# Patient Record
Sex: MALE | Race: WHITE | NOT HISPANIC OR LATINO | Employment: OTHER | URBAN - METROPOLITAN AREA
[De-identification: names, ages, dates, MRNs, and addresses within clinical notes are randomized per-mention and may not be internally consistent; named-entity substitution may affect disease eponyms.]

---

## 2018-03-15 ENCOUNTER — TRANSCRIBE ORDERS (OUTPATIENT)
Dept: ADMINISTRATIVE | Age: 68
End: 2018-03-15

## 2018-03-15 ENCOUNTER — APPOINTMENT (OUTPATIENT)
Dept: LAB | Age: 68
End: 2018-03-15
Payer: COMMERCIAL

## 2018-03-15 DIAGNOSIS — F32.A FATIGUE DUE TO DEPRESSION: Primary | ICD-10-CM

## 2018-03-15 DIAGNOSIS — R53.83 FATIGUE DUE TO DEPRESSION: ICD-10-CM

## 2018-03-15 DIAGNOSIS — F32.A FATIGUE DUE TO DEPRESSION: ICD-10-CM

## 2018-03-15 DIAGNOSIS — R53.83 FATIGUE DUE TO DEPRESSION: Primary | ICD-10-CM

## 2018-03-15 LAB
25(OH)D3 SERPL-MCNC: 23.6 NG/ML (ref 30–100)
ALBUMIN SERPL BCP-MCNC: 3.8 G/DL (ref 3.5–5)
ALP SERPL-CCNC: 68 U/L (ref 46–116)
ALT SERPL W P-5'-P-CCNC: 24 U/L (ref 12–78)
ANION GAP SERPL CALCULATED.3IONS-SCNC: 4 MMOL/L (ref 4–13)
AST SERPL W P-5'-P-CCNC: 22 U/L (ref 5–45)
BILIRUB SERPL-MCNC: 0.62 MG/DL (ref 0.2–1)
BUN SERPL-MCNC: 22 MG/DL (ref 5–25)
CALCIUM SERPL-MCNC: 8.8 MG/DL (ref 8.3–10.1)
CHLORIDE SERPL-SCNC: 107 MMOL/L (ref 100–108)
CO2 SERPL-SCNC: 28 MMOL/L (ref 21–32)
CREAT SERPL-MCNC: 1.14 MG/DL (ref 0.6–1.3)
ERYTHROCYTE [DISTWIDTH] IN BLOOD BY AUTOMATED COUNT: 13.2 % (ref 11.6–15.1)
GFR SERPL CREATININE-BSD FRML MDRD: 67 ML/MIN/1.73SQ M
GLUCOSE SERPL-MCNC: 85 MG/DL (ref 65–140)
HCT VFR BLD AUTO: 44.9 % (ref 36.5–49.3)
HGB BLD-MCNC: 15.7 G/DL (ref 12–17)
MCH RBC QN AUTO: 29.7 PG (ref 26.8–34.3)
MCHC RBC AUTO-ENTMCNC: 35 G/DL (ref 31.4–37.4)
MCV RBC AUTO: 85 FL (ref 82–98)
PLATELET # BLD AUTO: 184 THOUSANDS/UL (ref 149–390)
PMV BLD AUTO: 10.1 FL (ref 8.9–12.7)
POTASSIUM SERPL-SCNC: 4.4 MMOL/L (ref 3.5–5.3)
PROT SERPL-MCNC: 7.5 G/DL (ref 6.4–8.2)
RBC # BLD AUTO: 5.29 MILLION/UL (ref 3.88–5.62)
SODIUM SERPL-SCNC: 139 MMOL/L (ref 136–145)
TSH SERPL DL<=0.05 MIU/L-ACNC: 1.54 UIU/ML (ref 0.36–3.74)
VIT B12 SERPL-MCNC: 719 PG/ML (ref 100–900)
WBC # BLD AUTO: 4.21 THOUSAND/UL (ref 4.31–10.16)

## 2018-03-15 PROCEDURE — 85027 COMPLETE CBC AUTOMATED: CPT

## 2018-03-15 PROCEDURE — 80053 COMPREHEN METABOLIC PANEL: CPT

## 2018-03-15 PROCEDURE — 84443 ASSAY THYROID STIM HORMONE: CPT

## 2018-03-15 PROCEDURE — 82607 VITAMIN B-12: CPT

## 2018-03-15 PROCEDURE — 82306 VITAMIN D 25 HYDROXY: CPT

## 2018-03-15 PROCEDURE — 36415 COLL VENOUS BLD VENIPUNCTURE: CPT

## 2018-06-01 ENCOUNTER — TRANSCRIBE ORDERS (OUTPATIENT)
Dept: ADMINISTRATIVE | Age: 68
End: 2018-06-01

## 2018-06-01 ENCOUNTER — APPOINTMENT (OUTPATIENT)
Dept: LAB | Age: 68
End: 2018-06-01
Payer: COMMERCIAL

## 2018-06-01 DIAGNOSIS — R53.83 OTHER FATIGUE: Primary | ICD-10-CM

## 2018-06-01 DIAGNOSIS — R53.83 FATIGUE, UNSPECIFIED TYPE: ICD-10-CM

## 2018-06-01 LAB
ALBUMIN SERPL BCP-MCNC: 3.6 G/DL (ref 3.5–5)
ALP SERPL-CCNC: 77 U/L (ref 46–116)
ALT SERPL W P-5'-P-CCNC: 28 U/L (ref 12–78)
ANION GAP SERPL CALCULATED.3IONS-SCNC: 7 MMOL/L (ref 4–13)
AST SERPL W P-5'-P-CCNC: 22 U/L (ref 5–45)
BASOPHILS # BLD AUTO: 0.06 THOUSANDS/ΜL (ref 0–0.1)
BASOPHILS NFR BLD AUTO: 1 % (ref 0–1)
BILIRUB SERPL-MCNC: 0.68 MG/DL (ref 0.2–1)
BUN SERPL-MCNC: 14 MG/DL (ref 5–25)
CALCIUM SERPL-MCNC: 9 MG/DL (ref 8.3–10.1)
CHLORIDE SERPL-SCNC: 105 MMOL/L (ref 100–108)
CO2 SERPL-SCNC: 28 MMOL/L (ref 21–32)
CREAT SERPL-MCNC: 1.09 MG/DL (ref 0.6–1.3)
EOSINOPHIL # BLD AUTO: 0.26 THOUSAND/ΜL (ref 0–0.61)
EOSINOPHIL NFR BLD AUTO: 4 % (ref 0–6)
ERYTHROCYTE [DISTWIDTH] IN BLOOD BY AUTOMATED COUNT: 12.7 % (ref 11.6–15.1)
GFR SERPL CREATININE-BSD FRML MDRD: 71 ML/MIN/1.73SQ M
GLUCOSE SERPL-MCNC: 89 MG/DL (ref 65–140)
HCT VFR BLD AUTO: 44.4 % (ref 36.5–49.3)
HGB BLD-MCNC: 14.8 G/DL (ref 12–17)
IMM GRANULOCYTES # BLD AUTO: 0.01 THOUSAND/UL (ref 0–0.2)
IMM GRANULOCYTES NFR BLD AUTO: 0 % (ref 0–2)
LYMPHOCYTES # BLD AUTO: 1.35 THOUSANDS/ΜL (ref 0.6–4.47)
LYMPHOCYTES NFR BLD AUTO: 20 % (ref 14–44)
MCH RBC QN AUTO: 29.4 PG (ref 26.8–34.3)
MCHC RBC AUTO-ENTMCNC: 33.3 G/DL (ref 31.4–37.4)
MCV RBC AUTO: 88 FL (ref 82–98)
MONOCYTES # BLD AUTO: 0.54 THOUSAND/ΜL (ref 0.17–1.22)
MONOCYTES NFR BLD AUTO: 8 % (ref 4–12)
NEUTROPHILS # BLD AUTO: 4.5 THOUSANDS/ΜL (ref 1.85–7.62)
NEUTS SEG NFR BLD AUTO: 67 % (ref 43–75)
NRBC BLD AUTO-RTO: 0 /100 WBCS
PLATELET # BLD AUTO: 197 THOUSANDS/UL (ref 149–390)
PMV BLD AUTO: 10.6 FL (ref 8.9–12.7)
POTASSIUM SERPL-SCNC: 4 MMOL/L (ref 3.5–5.3)
PROT SERPL-MCNC: 7.1 G/DL (ref 6.4–8.2)
RBC # BLD AUTO: 5.04 MILLION/UL (ref 3.88–5.62)
SODIUM SERPL-SCNC: 140 MMOL/L (ref 136–145)
VIT B12 SERPL-MCNC: 696 PG/ML (ref 100–900)
WBC # BLD AUTO: 6.72 THOUSAND/UL (ref 4.31–10.16)

## 2018-06-01 PROCEDURE — 80053 COMPREHEN METABOLIC PANEL: CPT

## 2018-06-01 PROCEDURE — 86618 LYME DISEASE ANTIBODY: CPT

## 2018-06-01 PROCEDURE — 82607 VITAMIN B-12: CPT

## 2018-06-01 PROCEDURE — 36415 COLL VENOUS BLD VENIPUNCTURE: CPT

## 2018-06-01 PROCEDURE — 85025 COMPLETE CBC W/AUTO DIFF WBC: CPT

## 2018-06-04 LAB — MISCELLANEOUS LAB TEST RESULT: NORMAL

## 2018-06-06 ENCOUNTER — APPOINTMENT (OUTPATIENT)
Dept: LAB | Age: 68
End: 2018-06-06
Payer: COMMERCIAL

## 2018-06-06 DIAGNOSIS — R53.83 FATIGUE, UNSPECIFIED TYPE: ICD-10-CM

## 2018-06-06 LAB — TSH SERPL DL<=0.05 MIU/L-ACNC: 1.93 UIU/ML (ref 0.36–3.74)

## 2018-06-06 PROCEDURE — 36415 COLL VENOUS BLD VENIPUNCTURE: CPT

## 2018-06-06 PROCEDURE — 84443 ASSAY THYROID STIM HORMONE: CPT

## 2018-06-08 ENCOUNTER — APPOINTMENT (OUTPATIENT)
Dept: LAB | Age: 68
End: 2018-06-08
Payer: COMMERCIAL

## 2018-06-08 DIAGNOSIS — R53.83 OTHER FATIGUE: ICD-10-CM

## 2018-06-08 LAB
CRP SERPL QL: <3 MG/L
ERYTHROCYTE [SEDIMENTATION RATE] IN BLOOD: 13 MM/HOUR (ref 0–10)

## 2018-06-08 PROCEDURE — 82785 ASSAY OF IGE: CPT

## 2018-06-08 PROCEDURE — 85652 RBC SED RATE AUTOMATED: CPT

## 2018-06-08 PROCEDURE — 86777 TOXOPLASMA ANTIBODY: CPT

## 2018-06-08 PROCEDURE — 86778 TOXOPLASMA ANTIBODY IGM: CPT

## 2018-06-08 PROCEDURE — 36415 COLL VENOUS BLD VENIPUNCTURE: CPT

## 2018-06-08 PROCEDURE — 86140 C-REACTIVE PROTEIN: CPT

## 2018-06-09 LAB
CLINICAL COMMENT: NORMAL
T GONDII IGG SERPL IA-ACNC: <3 IU/ML (ref 0–7.1)
T GONDII IGM SER IA-ACNC: <3 AU/ML (ref 0–7.9)

## 2018-06-11 LAB — TOTAL IGE SMQN RAST: 59 KU/L (ref 0–113)

## 2018-06-29 ENCOUNTER — OFFICE VISIT (OUTPATIENT)
Dept: GASTROENTEROLOGY | Facility: MEDICAL CENTER | Age: 68
End: 2018-06-29
Payer: COMMERCIAL

## 2018-06-29 VITALS
BODY MASS INDEX: 27 KG/M2 | SYSTOLIC BLOOD PRESSURE: 120 MMHG | HEART RATE: 64 BPM | WEIGHT: 172 LBS | DIASTOLIC BLOOD PRESSURE: 70 MMHG | HEIGHT: 67 IN | TEMPERATURE: 97.9 F

## 2018-06-29 DIAGNOSIS — R11.0 NAUSEA: Primary | ICD-10-CM

## 2018-06-29 DIAGNOSIS — R10.12 LEFT UPPER QUADRANT PAIN: ICD-10-CM

## 2018-06-29 DIAGNOSIS — R63.4 WEIGHT LOSS: ICD-10-CM

## 2018-06-29 DIAGNOSIS — R53.83 FATIGUE, UNSPECIFIED TYPE: ICD-10-CM

## 2018-06-29 DIAGNOSIS — R63.0 LOSS OF APPETITE: ICD-10-CM

## 2018-06-29 PROCEDURE — 99244 OFF/OP CNSLTJ NEW/EST MOD 40: CPT | Performed by: INTERNAL MEDICINE

## 2018-06-29 RX ORDER — SODIUM, POTASSIUM,MAG SULFATES 17.5-3.13G
180 SOLUTION, RECONSTITUTED, ORAL ORAL ONCE
Qty: 180 ML | Refills: 0 | Status: SHIPPED | OUTPATIENT
Start: 2018-06-29 | End: 2018-08-21

## 2018-06-29 NOTE — LETTER
June 29, 2018     Referral [address] [place] [id number]    Patient: Margot Barrera   YOB: 1950   Date of Visit: 6/29/2018       Dear Dr Robyn Giron:    Thank you for referring Little Kimball to me for evaluation  Below are my notes for this consultation  If you have questions, please do not hesitate to call me  I look forward to following your patient along with you  Sincerely,        Harrison Neal MD        CC: Fidel Wayne MD  6/29/2018  9:09 AM  Sign at close encounter  [facility] - Outpatient Consultation  Margot Barrera 74 y o  male MRN: [id number]  Encounter: [id number]          ASSESSMENT AND PLAN:      1  Nausea  2  Fatigue, unspecified type  3  Loss of appetite  4  Left upper quadrant pain  5  Weight loss  His symptoms may be due to the Lipitor and I am hoping his symptoms continue to improve  However since they have persisted to this point I will schedule him for an upper endoscopy to rule out peptic ulcer disease, Helicobacter pylori infection, and gastritis  Because of his associated weight loss I will also schedule him for colonoscopy since his last was about 10 years ago  I encouraged him to take an increased dose of Zofran at 8 mg per dose and to take the lansoprazole on a daily basis half an hour before breakfast     ______________________________________________________________________    HPI:  He was started on Lipitor a few months ago and developed fatigue, nausea, decreased appetite, and mild left upper quadrant pain after starting the medicine  After a few weeks he stopped taking the medicine about four weeks ago, but his symptoms have persisted  He was told his symptoms were probably related to the Lipitor but he is concerned that he is not yet feeling better  He denies vomiting, change in bowel habits, or bleeding, and he reports about 10 lb weight loss over the past few months      He believes his last upper endoscopy was about eight years ago and is last colonoscopy was about 10 years ago  REVIEW OF SYSTEMS:    CONSTITUTIONAL: Denies any fever, chills, rigors, and weight loss, but complains of fatigue  HEENT: No earache or tinnitus  Denies hearing loss or visual disturbances  CARDIOVASCULAR: No chest pain or palpitations  RESPIRATORY: Denies any cough, hemoptysis, shortness of breath or dyspnea on exertion  GASTROINTESTINAL: As noted in the History of Present Illness  GENITOURINARY: No problems with urination  Denies any hematuria or dysuria  NEUROLOGIC: No dizziness or vertigo, denies headaches  MUSCULOSKELETAL: Denies any muscle or joint pain  SKIN: Denies skin rashes, but complains of itching  ENDOCRINE: Denies excessive thirst  Denies intolerance to heat or cold  PSYCHOSOCIAL: Denies depression or anxiety  Denies any recent memory loss  Historical Information   Past Medical History:   Diagnosis Date    GERD (gastroesophageal reflux disease)      Past Surgical History:   Procedure Laterality Date    BACK SURGERY      COLONOSCOPY      FOOT SURGERY       Social History   History   Alcohol Use No     History   Drug Use No     History   Smoking Status    Never Smoker   Smokeless Tobacco    Never Used     Family History   Problem Relation Age of Onset    Lung cancer Mother        Meds/Allergies     No current outpatient prescriptions on file  No Known Allergies        Objective     Blood pressure 120/70, pulse 64, temperature 97 9 °F (36 6 °C), temperature source Tympanic, height 5' 7" (1 702 m), weight 78 kg (172 lb)  Body mass index is 26 94 kg/m²          PHYSICAL EXAM:      General Appearance:   Alert, cooperative, no distress   HEENT:   Normocephalic, atraumatic, anicteric      Neck:  Supple, symmetrical, trachea midline   Lungs:   Clear to auscultation bilaterally; no rales, rhonchi or wheezing; respirations unlabored    Heart[de-identified]   Regular rate and rhythm; no murmur, rub, or gallop  Abdomen:   Soft, non-tender, non-distended; normal bowel sounds; no masses, no organomegaly    Genitalia:   Deferred    Rectal:   Deferred    Extremities:  No cyanosis, clubbing or edema    Pulses:  2+ and symmetric    Skin:  No jaundice, rashes, or lesions    Lymph nodes:  No palpable cervical lymphadenopathy        Lab Results:   No visits with results within 1 Day(s) from this visit  Latest known visit with results is:   Appointment on 06/08/2018   Component Date Value    CRP 06/08/2018 <3 0     Sed Rate 06/08/2018 13*    Toxoplasma Gondii IgG 06/08/2018 <3 0     Toxoplasma IgM 06/08/2018 <3 0     Comment 06/08/2018 Comment     IgE 06/08/2018 59 0          Radiology Results:   No results found

## 2018-06-29 NOTE — PROGRESS NOTES
[facility] - Outpatient Consultation  Brittney Young 74 y o  male MRN: [id number]  Encounter: [id number]          ASSESSMENT AND PLAN:      1  Nausea  2  Fatigue, unspecified type  3  Loss of appetite  4  Left upper quadrant pain  5  Weight loss  His symptoms may be due to the Lipitor and I am hoping his symptoms continue to improve  However since they have persisted to this point I will schedule him for an upper endoscopy to rule out peptic ulcer disease, Helicobacter pylori infection, and gastritis  Because of his associated weight loss I will also schedule him for colonoscopy since his last was about 10 years ago  I encouraged him to take an increased dose of Zofran at 8 mg per dose and to take the lansoprazole on a daily basis half an hour before breakfast     ______________________________________________________________________    HPI:  He was started on Lipitor a few months ago and developed fatigue, nausea, decreased appetite, and mild left upper quadrant pain after starting the medicine  After a few weeks he stopped taking the medicine about four weeks ago, but his symptoms have persisted  He was told his symptoms were probably related to the Lipitor but he is concerned that he is not yet feeling better  He denies vomiting, change in bowel habits, or bleeding, and he reports about 10 lb weight loss over the past few months  He believes his last upper endoscopy was about eight years ago and is last colonoscopy was about 10 years ago  REVIEW OF SYSTEMS:    CONSTITUTIONAL: Denies any fever, chills, rigors, and weight loss, but complains of fatigue  HEENT: No earache or tinnitus  Denies hearing loss or visual disturbances  CARDIOVASCULAR: No chest pain or palpitations  RESPIRATORY: Denies any cough, hemoptysis, shortness of breath or dyspnea on exertion  GASTROINTESTINAL: As noted in the History of Present Illness  GENITOURINARY: No problems with urination  Denies any hematuria or dysuria  NEUROLOGIC: No dizziness or vertigo, denies headaches  MUSCULOSKELETAL: Denies any muscle or joint pain  SKIN: Denies skin rashes, but complains of itching  ENDOCRINE: Denies excessive thirst  Denies intolerance to heat or cold  PSYCHOSOCIAL: Denies depression or anxiety  Denies any recent memory loss  Historical Information   Past Medical History:   Diagnosis Date    GERD (gastroesophageal reflux disease)      Past Surgical History:   Procedure Laterality Date    BACK SURGERY      COLONOSCOPY      FOOT SURGERY       Social History   History   Alcohol Use No     History   Drug Use No     History   Smoking Status    Never Smoker   Smokeless Tobacco    Never Used     Family History   Problem Relation Age of Onset    Lung cancer Mother        Meds/Allergies     No current outpatient prescriptions on file  No Known Allergies        Objective     Blood pressure 120/70, pulse 64, temperature 97 9 °F (36 6 °C), temperature source Tympanic, height 5' 7" (1 702 m), weight 78 kg (172 lb)  Body mass index is 26 94 kg/m²  PHYSICAL EXAM:      General Appearance:   Alert, cooperative, no distress   HEENT:   Normocephalic, atraumatic, anicteric      Neck:  Supple, symmetrical, trachea midline   Lungs:   Clear to auscultation bilaterally; no rales, rhonchi or wheezing; respirations unlabored    Heart[de-identified]   Regular rate and rhythm; no murmur, rub, or gallop  Abdomen:   Soft, non-tender, non-distended; normal bowel sounds; no masses, no organomegaly    Genitalia:   Deferred    Rectal:   Deferred    Extremities:  No cyanosis, clubbing or edema    Pulses:  2+ and symmetric    Skin:  No jaundice, rashes, or lesions    Lymph nodes:  No palpable cervical lymphadenopathy        Lab Results:   No visits with results within 1 Day(s) from this visit     Latest known visit with results is:   Appointment on 06/08/2018   Component Date Value    CRP 06/08/2018 <3 0     Sed Rate 06/08/2018 13*    Toxoplasma Gondii IgG 06/08/2018 <3 0     Toxoplasma IgM 06/08/2018 <3 0     Comment 06/08/2018 Comment     IgE 06/08/2018 59 0          Radiology Results:   No results found

## 2018-06-29 NOTE — PATIENT INSTRUCTIONS
Pt was scheduled July 25, 2018 at [place] [address] for a colon/egd with Dr Kenroy Corrigan was gone over by the MA  He is aware he will receive a call the day prior with an arrival time

## 2018-07-09 ENCOUNTER — TELEPHONE (OUTPATIENT)
Dept: GASTROENTEROLOGY | Facility: CLINIC | Age: 68
End: 2018-07-09

## 2018-07-09 DIAGNOSIS — R11.0 NAUSEA: Primary | ICD-10-CM

## 2018-07-09 RX ORDER — PROMETHAZINE HYDROCHLORIDE 12.5 MG/1
12.5 TABLET ORAL EVERY 6 HOURS PRN
Qty: 20 TABLET | Refills: 0 | Status: ON HOLD | OUTPATIENT
Start: 2018-07-09 | End: 2018-07-25

## 2018-07-09 NOTE — TELEPHONE ENCOUNTER
Dr Shady Lozano pt    Pt called in stating his pcp prescribed him Ondansetron, which he is now out of but that medication did not help much with his nausea  The pt would like to know if another medication can be prescribed for nausea  Please advise   Please send to [address] in [place] phone# [phone]

## 2018-07-24 ENCOUNTER — ANESTHESIA EVENT (OUTPATIENT)
Dept: GASTROENTEROLOGY | Facility: MEDICAL CENTER | Age: 68
End: 2018-07-24
Payer: COMMERCIAL

## 2018-07-25 ENCOUNTER — ANESTHESIA (OUTPATIENT)
Dept: GASTROENTEROLOGY | Facility: MEDICAL CENTER | Age: 68
End: 2018-07-25
Payer: COMMERCIAL

## 2018-07-25 ENCOUNTER — HOSPITAL ENCOUNTER (OUTPATIENT)
Facility: MEDICAL CENTER | Age: 68
Setting detail: OUTPATIENT SURGERY
Discharge: HOME | End: 2018-07-25
Attending: INTERNAL MEDICINE | Admitting: INTERNAL MEDICINE
Payer: COMMERCIAL

## 2018-07-25 VITALS
OXYGEN SATURATION: 97 % | WEIGHT: 174 LBS | SYSTOLIC BLOOD PRESSURE: 166 MMHG | HEART RATE: 73 BPM | HEIGHT: 67 IN | BODY MASS INDEX: 27.31 KG/M2 | DIASTOLIC BLOOD PRESSURE: 66 MMHG | TEMPERATURE: 98 F | RESPIRATION RATE: 16 BRPM

## 2018-07-25 DIAGNOSIS — R11.0 NAUSEA: ICD-10-CM

## 2018-07-25 DIAGNOSIS — R63.4 WEIGHT LOSS: ICD-10-CM

## 2018-07-25 DIAGNOSIS — R63.0 LOSS OF APPETITE: ICD-10-CM

## 2018-07-25 DIAGNOSIS — R53.83 FATIGUE, UNSPECIFIED TYPE: ICD-10-CM

## 2018-07-25 DIAGNOSIS — R10.12 LEFT UPPER QUADRANT PAIN: ICD-10-CM

## 2018-07-25 PROCEDURE — 88305 TISSUE EXAM BY PATHOLOGIST: CPT | Performed by: PATHOLOGY

## 2018-07-25 PROCEDURE — 45380 COLONOSCOPY AND BIOPSY: CPT | Performed by: INTERNAL MEDICINE

## 2018-07-25 PROCEDURE — 43239 EGD BIOPSY SINGLE/MULTIPLE: CPT | Performed by: INTERNAL MEDICINE

## 2018-07-25 PROCEDURE — 45385 COLONOSCOPY W/LESION REMOVAL: CPT | Performed by: INTERNAL MEDICINE

## 2018-07-25 PROCEDURE — 88342 IMHCHEM/IMCYTCHM 1ST ANTB: CPT | Performed by: PATHOLOGY

## 2018-07-25 RX ORDER — LANSOPRAZOLE 30 MG/1
30 CAPSULE, DELAYED RELEASE ORAL DAILY
COMMUNITY
End: 2018-08-01 | Stop reason: ALTCHOICE

## 2018-07-25 RX ORDER — PROMETHAZINE HYDROCHLORIDE 12.5 MG/1
25 TABLET ORAL EVERY 8 HOURS PRN
Qty: 60 TABLET | Refills: 5 | Status: SHIPPED | OUTPATIENT
Start: 2018-07-25 | End: 2018-11-16 | Stop reason: SDUPTHER

## 2018-07-25 RX ORDER — SODIUM CHLORIDE 9 MG/ML
125 INJECTION, SOLUTION INTRAVENOUS CONTINUOUS
Status: DISCONTINUED | OUTPATIENT
Start: 2018-07-25 | End: 2018-07-25 | Stop reason: HOSPADM

## 2018-07-25 RX ORDER — PROPOFOL 10 MG/ML
INJECTION, EMULSION INTRAVENOUS AS NEEDED
Status: DISCONTINUED | OUTPATIENT
Start: 2018-07-25 | End: 2018-07-25 | Stop reason: SURG

## 2018-07-25 RX ORDER — LIDOCAINE HYDROCHLORIDE 20 MG/ML
INJECTION, SOLUTION EPIDURAL; INFILTRATION; INTRACAUDAL; PERINEURAL AS NEEDED
Status: DISCONTINUED | OUTPATIENT
Start: 2018-07-25 | End: 2018-07-25 | Stop reason: SURG

## 2018-07-25 RX ADMIN — PROPOFOL 50 MG: 10 INJECTION, EMULSION INTRAVENOUS at 14:15

## 2018-07-25 RX ADMIN — PROPOFOL 50 MG: 10 INJECTION, EMULSION INTRAVENOUS at 14:19

## 2018-07-25 RX ADMIN — LIDOCAINE HYDROCHLORIDE 5 ML: 20 INJECTION, SOLUTION EPIDURAL; INFILTRATION; INTRACAUDAL; PERINEURAL at 14:08

## 2018-07-25 RX ADMIN — SODIUM CHLORIDE 125 ML/HR: 0.9 INJECTION, SOLUTION INTRAVENOUS at 13:21

## 2018-07-25 RX ADMIN — PROPOFOL 50 MG: 10 INJECTION, EMULSION INTRAVENOUS at 14:23

## 2018-07-25 RX ADMIN — PROPOFOL 150 MG: 10 INJECTION, EMULSION INTRAVENOUS at 14:08

## 2018-07-25 NOTE — ANESTHESIA PREPROCEDURE EVALUATION
Review of Systems/Medical History  Patient summary reviewed  Chart reviewed      Cardiovascular  Negative cardio ROS    Pulmonary  Negative pulmonary ROS        GI/Hepatic    GERD , Bowel prep  Comment: Weight loss     Negative  ROS        Endo/Other  Negative endo/other ROS      GYN       Hematology  Negative hematology ROS      Musculoskeletal  Negative musculoskeletal ROS   Comment: Fatigue      Neurology  Negative neurology ROS      Psychology   Negative psychology ROS              Physical Exam    Airway    Mallampati score: II  TM Distance: >3 FB  Neck ROM: full     Dental   No notable dental hx     Cardiovascular  Comment: Negative ROS, Rhythm: regular, Rate: normal, Cardiovascular exam normal    Pulmonary  Pulmonary exam normal Breath sounds clear to auscultation,     Other Findings        Anesthesia Plan  ASA Score- 2     Anesthesia Type- IV sedation with anesthesia with ASA Monitors  Additional Monitors:   Airway Plan:         Plan Factors- Patient instructed to abstain from smoking on day of procedure  Patient did not smoke on day of surgery  Induction- intravenous  Postoperative Plan-     Informed Consent- Anesthetic plan and risks discussed with patient

## 2018-07-25 NOTE — DISCHARGE INSTRUCTIONS
Gastritis   WHAT YOU NEED TO KNOW:   Gastritis is inflammation or irritation of the lining of your stomach  DISCHARGE INSTRUCTIONS:   Call 911 for any of the following:   · You develop chest pain or shortness of breath  Seek care immediately if:   · You vomit blood  · You have black or bloody bowel movements  · You have severe stomach or back pain  Contact your healthcare provider if:   · You have a fever  · You have new or worsening symptoms, even after treatment  · You have questions or concerns about your condition or care  Medicines:   · Medicines  may be given to help treat a bacterial infection or decrease stomach acid  · Take your medicine as directed  Contact your healthcare provider if you think your medicine is not helping or if you have side effects  Tell him or her if you are allergic to any medicine  Keep a list of the medicines, vitamins, and herbs you take  Include the amounts, and when and why you take them  Bring the list or the pill bottles to follow-up visits  Carry your medicine list with you in case of an emergency  Manage or prevent gastritis:   · Do not smoke  Nicotine and other chemicals in cigarettes and cigars can make your symptoms worse and cause lung damage  Ask your healthcare provider for information if you currently smoke and need help to quit  E-cigarettes or smokeless tobacco still contain nicotine  Talk to your healthcare provider before you use these products  · Do not drink alcohol  Alcohol can prevent healing and make your gastritis worse  Talk to your healthcare provider if you need help to stop drinking  · Do not take NSAIDs or aspirin unless directed  These and similar medicines can cause irritation  If your healthcare provider says it is okay to take NSAIDs, take them with food  · Do not eat foods that cause irritation  Foods such as oranges and salsa can cause burning or pain  Eat a variety of healthy foods   Examples include fruits (not citrus), vegetables, low-fat dairy products, beans, whole-grain breads, and lean meats and fish  Try to eat small meals, and drink water with your meals  Do not eat for at least 3 hours before you go to bed  · Find ways to relax and decrease stress  Stress can increase stomach acid and make gastritis worse  Activities such as yoga, meditation, or listening to music can help you relax  Spend time with friends, or do things you enjoy  Follow up with your healthcare provider as directed: You may need ongoing tests or treatment, or referral to a gastroenterologist  Write down your questions so you remember to ask them during your visits  © 2017 [employer]  Information is for End User's use only and may not be sold, redistributed or otherwise used for commercial purposes  All illustrations and images included in CareNotes® are the copyrighted property of A D [employer] M , Inc  or Archie Panda  The above information is an  only  It is not intended as medical advice for individual conditions or treatments  Talk to your doctor, nurse or pharmacist before following any medical regimen to see if it is safe and effective for you  Colorectal Polyps   WHAT YOU NEED TO KNOW:   What are colorectal polyps? Colorectal polyps are small growths of tissue in the lining of the colon and rectum  Most polyps are hyperplastic polyps and are usually benign (noncancerous)  Certain types of polyps, called adenomatous polyps, may turn into cancer  What increases my risk of colorectal polyps? The exact cause of colorectal polyps is unknown   The following may increase your risk:  · Older age    · A diet of foods high in fat and low in fiber     · Family history of polyps    · Intestinal diseases, such as Crohn's disease or ulcerative colitis    · An unhealthy lifestyle, such as physical inactivity, smoking, or drinking alcohol    · Obesity  What are the signs and symptoms of colorectal polyps? · Blood in your bowel movement or bleeding from the rectum    · Change in bowel movement habits, such as diarrhea and constipation    · Abdominal pain  How are colorectal polyps diagnosed? You should have fecal blood screening once a year for colorectal disease if you are over 48years old  You should be screened earlier if you have an intestinal disease or a family history of polyps or colorectal cancer  During this screening, a sample of your bowel movement is checked for blood, which may be an early sign of colorectal polyps or cancer  You may also need any of the following tests:  · Digital rectal exam:  Your healthcare provider will examine your anus and use a finger to check your rectum for polyps  · Barium enema: A barium enema is an x-ray of the colon  A tube is put into your anus, and a liquid called barium is put through the tube  Barium is used so that caregivers can see your colon better on the x-ray film  · Virtual colonoscopy: This is a CT scan that takes pictures of the inside of your colon and rectum  A small, flexible tube is put into your rectum and air or carbon dioxide (gas) is used to expand your colon  This lets healthcare providers clearly see your colon and any polyps on a monitor  · Colonoscopy or sigmoidoscopy: These procedures help your healthcare provider see the inside of your colon using a flexible tube with a small light and camera on the end  During a sigmoidoscopy, your healthcare provider will only look at rectum and lower colon  During a colonoscopy, healthcare providers will look at the full length of your colon  Healthcare providers may remove a small amount of tissue from the colon for a biopsy  How are colorectal polyps treated? · Polyp removal:  Polyps may be removed during your sigmoidoscopy or colonoscopy  · Polypectomy: This is surgery to remove your polyps   You may need laparoscopic or open surgery, depending on the type, size, and number of polyps that you have  Laparoscopy is done by inserting a small, flexible scope into incisions made on your abdomen  Open surgery is done by making a larger incision on your abdomen   What are the risks of colorectal polyps? You may bleed during a colonoscopy procedure  Your bowel may be perforated (torn) when polyps are removed  This may lead to an open abdominal surgery  During surgery, you may bleed too much or get an infection  Adenomatous polyps that are not removed may turn into cancer and become more difficult to treat  Where can I find support and more information? · [facility] ([facility])  [address] [place], [place] [address]  Phone: [phone]  Web Address: Sameera Blanton  [facility] gov  When should I contact my healthcare provider? · You have a fever  · You have chills, a cough, or feel weak and achy  · You have abdominal pain that does not go away or gets worse after you take medicine  · Your abdomen is swollen  · You are losing weight without trying  · You have questions or concerns about your condition or care  When should I seek immediate care or call 911? · You have sudden shortness of breath  · You have a fast heart rate, fast breathing, or are too dizzy to stand up  · You have severe abdominal pain  · You see blood in your bowel movement  CARE AGREEMENT:   You have the right to help plan your care  Learn about your health condition and how it may be treated  Discuss treatment options with your caregivers to decide what care you want to receive  You always have the right to refuse treatment  The above information is an  only  It is not intended as medical advice for individual conditions or treatments  Talk to your doctor, nurse or pharmacist before following any medical regimen to see if it is safe and effective for you    © 2017 [facility] is for End User's use only and may not be sold, redistributed or otherwise used for commercial purposes  All illustrations and images included in CareNotes® are the copyrighted property of A D A M , Inc  or Archie Panda  Upper Endoscopy   WHAT YOU NEED TO KNOW:   An upper endoscopy is also called an upper gastrointestinal (GI) endoscopy, or an esophagogastroduodenoscopy (EGD)  You may feel bloated, gassy, or have some abdominal discomfort after your procedure  Your throat may be sore for 24 to 36 hours  You may burp or pass gas from air that is still inside your body  DISCHARGE INSTRUCTIONS:   Call 911 if:   · You have sudden chest pain or trouble breathing  Seek care immediately if:   · You feel dizzy or faint  · You have trouble swallowing  · You have severe throat pain  · Your bowel movements are very dark or black  · Your abdomen is hard and firm and you have severe pain  · You vomit blood  Contact your healthcare provider if:   · You feel full or bloated and cannot burp or pass gas  · You have not had a bowel movement for 3 days after your procedure  · You have neck pain  · You have a fever or chills  · You have nausea or are vomiting  · You have a rash or hives  · You have questions or concerns about your endoscopy  Relieve a sore throat:  Suck on throat lozenges or crushed ice  Gargle with a small amount of warm salt water  Mix 1 teaspoon of salt and 1 cup of warm water to make salt water  Relieve gas and discomfort from bloating:  Lie on your right side with a heating pad on your abdomen  Take short walks to help pass gas  Eat small meals until bloating is relieved  Rest after your procedure:  Do not drive or make important decisions until the day after your procedure  Return to your normal activity as directed  You can usually return to work the day after your procedure    Follow up with your healthcare provider as directed:  Write down your questions so you remember to ask them during your visits  © 2017 [address] [employer] Information is for End User's use only and may not be sold, redistributed or otherwise used for commercial purposes  All illustrations and images included in CareNotes® are the copyrighted property of A D A M , Inc  or Archie Panda  The above information is an  only  It is not intended as medical advice for individual conditions or treatments  Talk to your doctor, nurse or pharmacist before following any medical regimen to see if it is safe and effective for you  Colonoscopy   WHAT YOU NEED TO KNOW:   A colonoscopy is a procedure to examine the inside of your colon (intestine) with a scope  Polyps or tissue growths may have been removed during your colonoscopy  It is normal to feel bloated and to have some abdominal discomfort  You should be passing gas  If you have hemorrhoids or you had polyps removed, you may have a small amount of bleeding  DISCHARGE INSTRUCTIONS:   Seek care immediately if:   · You have a large amount of bright red blood in your bowel movements  · Your abdomen is hard and firm and you have severe pain  · You have sudden trouble breathing  Contact your healthcare provider if:   · You develop a rash or hives  · You have a fever within 24 hours of your procedure  · You have not had a bowel movement for 3 days after your procedure  · You have questions or concerns about your condition or care  Activity:   · Do not lift, strain, or run  for 3 days after your procedure  · Rest after your procedure  You have been given medicine to relax you  Do not  drive or make important decisions until the day after your procedure  Return to your normal activity as directed  · Relieve gas and discomfort from bloating  by lying on your right side with a heating pad on your abdomen  You may need to take short walks to help the gas move out   Eat small meals until bloating is relieved  If you had polyps removed: For 7 days after your procedure:  · Do not  take aspirin  · Do not  go on long car rides  Help prevent constipation:   · Eat a variety of healthy foods  Healthy foods include fruit, vegetables, whole-grain breads, low-fat dairy products, beans, lean meat, and fish  Ask if you need to be on a special diet  Your healthcare provider may recommend that you eat high-fiber foods such as cooked beans  Fiber helps you have regular bowel movements  · Drink liquids as directed  Adults should drink between 9 and 13 eight-ounce cups of liquid every day  Ask what amount is best for you  For most people, good liquids to drink are water, juice, and milk  · Exercise as directed  Talk to your healthcare provider about the best exercise plan for you  Exercise can help prevent constipation, decrease your blood pressure and improve your health  Follow up with your healthcare provider as directed:  Write down your questions so you remember to ask them during your visits  © 2017 [facility] Information is for End User's use only and may not be sold, redistributed or otherwise used for commercial purposes  All illustrations and images included in CareNotes® are the copyrighted property of [employer]  or Archie Panda  The above information is an  only  It is not intended as medical advice for individual conditions or treatments  Talk to your doctor, nurse or pharmacist before following any medical regimen to see if it is safe and effective for you

## 2018-07-25 NOTE — H&P (VIEW-ONLY)
[facility] - Outpatient Consultation  Abdelrahman Salinas 74 y o  male MRN: [id number]  Encounter: [id number]          ASSESSMENT AND PLAN:      1  Nausea  2  Fatigue, unspecified type  3  Loss of appetite  4  Left upper quadrant pain  5  Weight loss  His symptoms may be due to the Lipitor and I am hoping his symptoms continue to improve  However since they have persisted to this point I will schedule him for an upper endoscopy to rule out peptic ulcer disease, Helicobacter pylori infection, and gastritis  Because of his associated weight loss I will also schedule him for colonoscopy since his last was about 10 years ago  I encouraged him to take an increased dose of Zofran at 8 mg per dose and to take the lansoprazole on a daily basis half an hour before breakfast     ______________________________________________________________________    HPI:  He was started on Lipitor a few months ago and developed fatigue, nausea, decreased appetite, and mild left upper quadrant pain after starting the medicine  After a few weeks he stopped taking the medicine about four weeks ago, but his symptoms have persisted  He was told his symptoms were probably related to the Lipitor but he is concerned that he is not yet feeling better  He denies vomiting, change in bowel habits, or bleeding, and he reports about 10 lb weight loss over the past few months  He believes his last upper endoscopy was about eight years ago and is last colonoscopy was about 10 years ago  REVIEW OF SYSTEMS:    CONSTITUTIONAL: Denies any fever, chills, rigors, and weight loss, but complains of fatigue  HEENT: No earache or tinnitus  Denies hearing loss or visual disturbances  CARDIOVASCULAR: No chest pain or palpitations  RESPIRATORY: Denies any cough, hemoptysis, shortness of breath or dyspnea on exertion  GASTROINTESTINAL: As noted in the History of Present Illness  GENITOURINARY: No problems with urination  Denies any hematuria or dysuria  NEUROLOGIC: No dizziness or vertigo, denies headaches  MUSCULOSKELETAL: Denies any muscle or joint pain  SKIN: Denies skin rashes, but complains of itching  ENDOCRINE: Denies excessive thirst  Denies intolerance to heat or cold  PSYCHOSOCIAL: Denies depression or anxiety  Denies any recent memory loss  Historical Information   Past Medical History:   Diagnosis Date    GERD (gastroesophageal reflux disease)      Past Surgical History:   Procedure Laterality Date    BACK SURGERY      COLONOSCOPY      FOOT SURGERY       Social History   History   Alcohol Use No     History   Drug Use No     History   Smoking Status    Never Smoker   Smokeless Tobacco    Never Used     Family History   Problem Relation Age of Onset    Lung cancer Mother        Meds/Allergies     No current outpatient prescriptions on file  No Known Allergies        Objective     Blood pressure 120/70, pulse 64, temperature 97 9 °F (36 6 °C), temperature source Tympanic, height 5' 7" (1 702 m), weight 78 kg (172 lb)  Body mass index is 26 94 kg/m²  PHYSICAL EXAM:      General Appearance:   Alert, cooperative, no distress   HEENT:   Normocephalic, atraumatic, anicteric      Neck:  Supple, symmetrical, trachea midline   Lungs:   Clear to auscultation bilaterally; no rales, rhonchi or wheezing; respirations unlabored    Heart[de-identified]   Regular rate and rhythm; no murmur, rub, or gallop  Abdomen:   Soft, non-tender, non-distended; normal bowel sounds; no masses, no organomegaly    Genitalia:   Deferred    Rectal:   Deferred    Extremities:  No cyanosis, clubbing or edema    Pulses:  2+ and symmetric    Skin:  No jaundice, rashes, or lesions    Lymph nodes:  No palpable cervical lymphadenopathy        Lab Results:   No visits with results within 1 Day(s) from this visit     Latest known visit with results is:   Appointment on 06/08/2018   Component Date Value    CRP 06/08/2018 <3 0     Sed Rate 06/08/2018 13*    Toxoplasma Gondii IgG 06/08/2018 <3 0     Toxoplasma IgM 06/08/2018 <3 0     Comment 06/08/2018 Comment     IgE 06/08/2018 59 0          Radiology Results:   No results found

## 2018-07-25 NOTE — ANESTHESIA POSTPROCEDURE EVALUATION
Post-Op Assessment Note      CV Status:  Stable    Mental Status:  Alert and awake    Hydration Status:  Euvolemic    PONV Controlled:  Controlled    Airway Patency:  Patent    Post Op Vitals Reviewed: Yes          Staff: Anesthesiologist           /66 (07/25/18 1451)    Temp      Pulse 73 (07/25/18 1451)   Resp 16 (07/25/18 1451)    SpO2 97 % (07/25/18 1451)

## 2018-07-25 NOTE — OP NOTE
**** GI/ENDOSCOPY REPORT ****     PATIENT NAME: Demetria FLOWERS ------ VISIT ID:  Patient ID:   [id number] YOB: 1950     INTRODUCTION: Colonoscopy - A 74 male patient presents for an outpatient   Colonoscopy at [address] [place] [address]  PREVIOUS COLONOSCOPY: Patient's last colonoscopy was 10 years ago  INDICATIONS: Loss of weight  Nausea  CONSENT:  The benefits, risks, and alternatives to the procedure were   discussed and informed consent was obtained from the patient  PREPARATION: EKG, pulse, pulse oximetry and blood pressure were monitored   throughout the procedure  The patient was identified by myself both   verbally and by visual inspection of ID band  Airway Assessment   Classification: Airway class 2 - Visualization of the soft palate, fauces   and uvula  ASA Classification: Class 2 - Patient has mild to moderate   systemic disturbance that may or may not be related to the disorder   requiring surgery  MEDICATIONS: Anesthesia-check records     PROCEDURE:  The endoscope was passed without difficulty through the anus   under direct visualization and advanced to the terminal ileum  The scope   was withdrawn and the mucosa was carefully examined  The quality of the   preparation was good  Retroflexion was performed  Cecal Intubation Time: 3   minutes(s) Scope Withdrawal Time: 12 minutes(s)     RECTAL EXAM: Normal rectal exam      FINDINGS:  A single pedunculated polyp, measuring 6 mm in size, was found   in the sigmoid colon  The polyp was removed by cold snare polypectomy  A   single sessile polyp, measuring less than 5 mm in size, was found in the   rectum  The polyp was removed by cold biopsy polypectomy  Multiple random   biopsies was taken  There was evidence of mild diverticulosis in the   sigmoid colon  COMPLICATIONS: There were no complications       IMPRESSIONS: A single pedunculated polyp found in the sigmoid colon;   removed by cold snare polypectomy  A single sessile polyp found in the   rectum; removed by cold biopsy polypectomy  Mild diverticulosis found in   the sigmoid colon  RECOMMENDATIONS: Follow-up on the results of the biopsy specimens  Repeat   colonoscopy in 5 years or sooner if clinically indicated  Repeat   colonoscopy is being recommended at an interval of less than 10 years,   this is because of a personal history of polyps  ESTIMATED BLOOD LOSS:     PATHOLOGY SPECIMENS: Removed by cold snare polypectomy  Removed by cold   biopsy polypectomy  Multiple random biopsies taken  PROCEDURE CODES:     ICD-9 Codes: 783 21 Loss of weight 211 3 Benign neoplasm of colon 211 4   Benign neoplasm of rectum and anal canal 562 10 Diverticulosis of colon   (without mention of hemorrhage)     ICD-10 Codes: R63 4 Abnormal weight loss K63 5 Polyp of colon K63 5 Polyp   of colon K57 Diverticular disease of intestine     PERFORMED BY: ELIZABETH Mcclellan  on 07/25/2018  Version 1, electronically signed by ELIZABETH Gayle  on 07/25/2018   at 15:17

## 2018-07-31 NOTE — PROGRESS NOTES
My medical assistant will call him with his results  The colon polyps were adenomas so his next colonoscopy should be in five years or sooner if clinically indicated  The rest of his biopsies were all negative

## 2018-08-01 DIAGNOSIS — R11.0 NAUSEA: Primary | ICD-10-CM

## 2018-08-01 RX ORDER — PANTOPRAZOLE SODIUM 40 MG/1
40 TABLET, DELAYED RELEASE ORAL DAILY
Qty: 30 TABLET | Refills: 2 | Status: SHIPPED | OUTPATIENT
Start: 2018-08-01 | End: 2019-12-20

## 2018-08-02 DIAGNOSIS — R11.2 NAUSEA AND VOMITING IN ADULT PATIENT: Primary | ICD-10-CM

## 2018-08-06 ENCOUNTER — TELEPHONE (OUTPATIENT)
Dept: GASTROENTEROLOGY | Facility: CLINIC | Age: 68
End: 2018-08-06

## 2018-08-06 DIAGNOSIS — R63.0 LOSS OF APPETITE: ICD-10-CM

## 2018-08-06 DIAGNOSIS — R10.12 LEFT UPPER QUADRANT PAIN: ICD-10-CM

## 2018-08-06 DIAGNOSIS — R11.0 NAUSEA: Primary | ICD-10-CM

## 2018-08-06 DIAGNOSIS — R63.4 WEIGHT LOSS: ICD-10-CM

## 2018-08-06 NOTE — TELEPHONE ENCOUNTER
Patient called back, patient called back requesting the CT be for the abdomen, pelvis and chest      Please advise

## 2018-08-06 NOTE — TELEPHONE ENCOUNTER
Zay Santoyo patient    He wanted to know if you could order an mri or cat scan that can be done prior to the gastric emptying on 8-23-18

## 2018-08-07 ENCOUNTER — TRANSCRIBE ORDERS (OUTPATIENT)
Dept: ADMINISTRATIVE | Age: 68
End: 2018-08-07

## 2018-08-07 ENCOUNTER — DOCUMENTATION (OUTPATIENT)
Dept: GASTROENTEROLOGY | Facility: CLINIC | Age: 68
End: 2018-08-07

## 2018-08-07 NOTE — TELEPHONE ENCOUNTER
GERD is not an indication for a chest xray & nothing will be seen  Again without respiratory sxs it is not necessary    Soheila Rouse

## 2018-08-07 NOTE — TELEPHONE ENCOUNTER
Called to make patient aware, he understands that it would not likely be covered and does not want to add it on, however, he is now asking if a chest x-ray would be possible  Patient states that he does experience GERD and would like to know if that would be a reason for the x-ray  Please advise

## 2018-08-10 ENCOUNTER — HOSPITAL ENCOUNTER (OUTPATIENT)
Dept: RADIOLOGY | Age: 68
Discharge: HOME | End: 2018-08-10
Payer: COMMERCIAL

## 2018-08-10 DIAGNOSIS — R11.0 NAUSEA: ICD-10-CM

## 2018-08-10 DIAGNOSIS — R10.12 LEFT UPPER QUADRANT PAIN: ICD-10-CM

## 2018-08-10 DIAGNOSIS — R63.0 LOSS OF APPETITE: ICD-10-CM

## 2018-08-10 DIAGNOSIS — R63.4 WEIGHT LOSS: ICD-10-CM

## 2018-08-10 PROCEDURE — 74177 CT ABD & PELVIS W/CONTRAST: CPT

## 2018-08-10 RX ADMIN — IOHEXOL 100 ML: 350 INJECTION, SOLUTION INTRAVENOUS at 11:07

## 2018-08-14 ENCOUNTER — TELEPHONE (OUTPATIENT)
Dept: GASTROENTEROLOGY | Facility: CLINIC | Age: 68
End: 2018-08-14

## 2018-08-14 ENCOUNTER — APPOINTMENT (OUTPATIENT)
Dept: LAB | Age: 68
End: 2018-08-14
Payer: COMMERCIAL

## 2018-08-14 DIAGNOSIS — R93.5 ABNORMAL CT OF THE ABDOMEN: ICD-10-CM

## 2018-08-14 DIAGNOSIS — K86.89 PANCREATIC MASS: Primary | ICD-10-CM

## 2018-08-14 DIAGNOSIS — R93.5 ABNORMAL CT OF THE ABDOMEN: Primary | ICD-10-CM

## 2018-08-14 LAB
ALBUMIN SERPL BCP-MCNC: 3.7 G/DL (ref 3.5–5)
ALP SERPL-CCNC: 80 U/L (ref 46–116)
ALT SERPL W P-5'-P-CCNC: 23 U/L (ref 12–78)
ANION GAP SERPL CALCULATED.3IONS-SCNC: 4 MMOL/L (ref 4–13)
AST SERPL W P-5'-P-CCNC: 19 U/L (ref 5–45)
BILIRUB SERPL-MCNC: 0.65 MG/DL (ref 0.2–1)
BUN SERPL-MCNC: 20 MG/DL (ref 5–25)
CALCIUM SERPL-MCNC: 9.1 MG/DL (ref 8.3–10.1)
CHLORIDE SERPL-SCNC: 103 MMOL/L (ref 100–108)
CO2 SERPL-SCNC: 31 MMOL/L (ref 21–32)
CREAT SERPL-MCNC: 1.1 MG/DL (ref 0.6–1.3)
GFR SERPL CREATININE-BSD FRML MDRD: 70 ML/MIN/1.73SQ M
GLUCOSE SERPL-MCNC: 100 MG/DL (ref 65–140)
POTASSIUM SERPL-SCNC: 4.2 MMOL/L (ref 3.5–5.3)
PROT SERPL-MCNC: 7.2 G/DL (ref 6.4–8.2)
SODIUM SERPL-SCNC: 138 MMOL/L (ref 136–145)

## 2018-08-14 PROCEDURE — 36415 COLL VENOUS BLD VENIPUNCTURE: CPT

## 2018-08-14 PROCEDURE — 80053 COMPREHEN METABOLIC PANEL: CPT

## 2018-08-14 NOTE — TELEPHONE ENCOUNTER
Dr Yady Goode pt    [facility] from b reading room called in to advise the CT report for the patient shows immediate findings  Tiger text sent to the doctor as well

## 2018-08-14 NOTE — TELEPHONE ENCOUNTER
It appears that [facility] addressed this and ordered an MRI  I agree with getting an MRI for further evaluation of findings

## 2018-08-15 ENCOUNTER — DOCUMENTATION (OUTPATIENT)
Dept: GASTROENTEROLOGY | Facility: MEDICAL CENTER | Age: 68
End: 2018-08-15

## 2018-08-16 ENCOUNTER — TELEPHONE (OUTPATIENT)
Dept: GASTROENTEROLOGY | Facility: MEDICAL CENTER | Age: 68
End: 2018-08-16

## 2018-08-20 ENCOUNTER — HOSPITAL ENCOUNTER (OUTPATIENT)
Dept: MRI IMAGING | Facility: HOSPITAL | Age: 68
Discharge: HOME | End: 2018-08-20
Payer: COMMERCIAL

## 2018-08-20 DIAGNOSIS — K86.89 PANCREATIC MASS: ICD-10-CM

## 2018-08-20 PROCEDURE — 74183 MRI ABD W/O CNTR FLWD CNTR: CPT

## 2018-08-20 PROCEDURE — 76376 3D RENDER W/INTRP POSTPROCES: CPT

## 2018-08-20 PROCEDURE — A9585 GADOBUTROL INJECTION: HCPCS | Performed by: PHYSICIAN ASSISTANT

## 2018-08-20 RX ADMIN — GADOBUTROL 8 ML: 604.72 INJECTION INTRAVENOUS at 09:15

## 2018-08-21 ENCOUNTER — HOSPITAL ENCOUNTER (EMERGENCY)
Facility: HOSPITAL | Age: 68
Discharge: HOME | End: 2018-08-21
Attending: EMERGENCY MEDICINE | Admitting: EMERGENCY MEDICINE
Payer: COMMERCIAL

## 2018-08-21 ENCOUNTER — TELEPHONE (OUTPATIENT)
Dept: GASTROENTEROLOGY | Facility: AMBULARY SURGERY CENTER | Age: 68
End: 2018-08-21

## 2018-08-21 VITALS
HEART RATE: 54 BPM | WEIGHT: 174.16 LBS | SYSTOLIC BLOOD PRESSURE: 138 MMHG | BODY MASS INDEX: 27.34 KG/M2 | HEIGHT: 67 IN | DIASTOLIC BLOOD PRESSURE: 74 MMHG | TEMPERATURE: 98.3 F | RESPIRATION RATE: 16 BRPM | OXYGEN SATURATION: 95 %

## 2018-08-21 DIAGNOSIS — R53.1 GENERALIZED WEAKNESS: ICD-10-CM

## 2018-08-21 DIAGNOSIS — R20.2 TINGLING IN EXTREMITIES: Primary | ICD-10-CM

## 2018-08-21 LAB
ALBUMIN SERPL BCP-MCNC: 3.5 G/DL (ref 3.5–5)
ALP SERPL-CCNC: 79 U/L (ref 46–116)
ALT SERPL W P-5'-P-CCNC: 26 U/L (ref 12–78)
ANION GAP SERPL CALCULATED.3IONS-SCNC: 7 MMOL/L (ref 4–13)
AST SERPL W P-5'-P-CCNC: 22 U/L (ref 5–45)
BASOPHILS # BLD AUTO: 0.06 THOUSANDS/ΜL (ref 0–0.1)
BASOPHILS NFR BLD AUTO: 1 % (ref 0–1)
BILIRUB SERPL-MCNC: 0.7 MG/DL (ref 0.2–1)
BUN SERPL-MCNC: 15 MG/DL (ref 5–25)
CALCIUM SERPL-MCNC: 8.7 MG/DL (ref 8.3–10.1)
CHLORIDE SERPL-SCNC: 106 MMOL/L (ref 100–108)
CO2 SERPL-SCNC: 26 MMOL/L (ref 21–32)
CREAT SERPL-MCNC: 1.02 MG/DL (ref 0.6–1.3)
EOSINOPHIL # BLD AUTO: 0.28 THOUSAND/ΜL (ref 0–0.61)
EOSINOPHIL NFR BLD AUTO: 4 % (ref 0–6)
ERYTHROCYTE [DISTWIDTH] IN BLOOD BY AUTOMATED COUNT: 12.3 % (ref 11.6–15.1)
GFR SERPL CREATININE-BSD FRML MDRD: 77 ML/MIN/1.73SQ M
GLUCOSE SERPL-MCNC: 103 MG/DL (ref 65–140)
HCT VFR BLD AUTO: 42.4 % (ref 36.5–49.3)
HGB BLD-MCNC: 14.5 G/DL (ref 12–17)
IMM GRANULOCYTES # BLD AUTO: 0.01 THOUSAND/UL (ref 0–0.2)
IMM GRANULOCYTES NFR BLD AUTO: 0 % (ref 0–2)
LYMPHOCYTES # BLD AUTO: 1.51 THOUSANDS/ΜL (ref 0.6–4.47)
LYMPHOCYTES NFR BLD AUTO: 20 % (ref 14–44)
MAGNESIUM SERPL-MCNC: 2 MG/DL (ref 1.6–2.6)
MCH RBC QN AUTO: 29.2 PG (ref 26.8–34.3)
MCHC RBC AUTO-ENTMCNC: 34.2 G/DL (ref 31.4–37.4)
MCV RBC AUTO: 86 FL (ref 82–98)
MONOCYTES # BLD AUTO: 0.89 THOUSAND/ΜL (ref 0.17–1.22)
MONOCYTES NFR BLD AUTO: 12 % (ref 4–12)
NEUTROPHILS # BLD AUTO: 4.86 THOUSANDS/ΜL (ref 1.85–7.62)
NEUTS SEG NFR BLD AUTO: 63 % (ref 43–75)
NRBC BLD AUTO-RTO: 0 /100 WBCS
PLATELET # BLD AUTO: 177 THOUSANDS/UL (ref 149–390)
PMV BLD AUTO: 9.8 FL (ref 8.9–12.7)
POTASSIUM SERPL-SCNC: 3.6 MMOL/L (ref 3.5–5.3)
PROT SERPL-MCNC: 7 G/DL (ref 6.4–8.2)
RBC # BLD AUTO: 4.96 MILLION/UL (ref 3.88–5.62)
SODIUM SERPL-SCNC: 139 MMOL/L (ref 136–145)
TROPONIN I SERPL-MCNC: <0.02 NG/ML
TROPONIN I SERPL-MCNC: <0.02 NG/ML
TSH SERPL DL<=0.05 MIU/L-ACNC: 2.83 UIU/ML (ref 0.36–3.74)
VIT B12 SERPL-MCNC: 742 PG/ML (ref 100–900)
WBC # BLD AUTO: 7.61 THOUSAND/UL (ref 4.31–10.16)

## 2018-08-21 PROCEDURE — 82607 VITAMIN B-12: CPT | Performed by: EMERGENCY MEDICINE

## 2018-08-21 PROCEDURE — 83735 ASSAY OF MAGNESIUM: CPT | Performed by: EMERGENCY MEDICINE

## 2018-08-21 PROCEDURE — 36415 COLL VENOUS BLD VENIPUNCTURE: CPT | Performed by: EMERGENCY MEDICINE

## 2018-08-21 PROCEDURE — 85025 COMPLETE CBC W/AUTO DIFF WBC: CPT | Performed by: EMERGENCY MEDICINE

## 2018-08-21 PROCEDURE — 80053 COMPREHEN METABOLIC PANEL: CPT | Performed by: EMERGENCY MEDICINE

## 2018-08-21 PROCEDURE — 93005 ELECTROCARDIOGRAM TRACING: CPT

## 2018-08-21 PROCEDURE — 84443 ASSAY THYROID STIM HORMONE: CPT | Performed by: EMERGENCY MEDICINE

## 2018-08-21 PROCEDURE — 84484 ASSAY OF TROPONIN QUANT: CPT | Performed by: EMERGENCY MEDICINE

## 2018-08-21 PROCEDURE — 99285 EMERGENCY DEPT VISIT HI MDM: CPT

## 2018-08-21 RX ORDER — FLUOXETINE 20 MG/1
20 TABLET, FILM COATED ORAL DAILY
COMMUNITY
End: 2019-12-20

## 2018-08-21 RX ORDER — PROCHLORPERAZINE MALEATE 5 MG/1
5 TABLET ORAL ONCE
Status: COMPLETED | OUTPATIENT
Start: 2018-08-21 | End: 2018-08-21

## 2018-08-21 RX ADMIN — PROCHLORPERAZINE MALEATE 5 MG: 5 TABLET, FILM COATED ORAL at 20:06

## 2018-08-21 NOTE — TELEPHONE ENCOUNTER
DR RAMIREZ'S PT    Radiology called with significant findings from MRI of the abd   Eloiseridge Yee was tiger txt

## 2018-08-21 NOTE — ED PROVIDER NOTES
History  Chief Complaint   Patient presents with    Weakness - Generalized      Pt reports last 3 days "tingling weakness" that starts in his lower legs and travels up to his upper shoulders and head,"  Reports nausea, fatigue, and low appetite for "months" Denies pain, reports being on Prozac for anxiety  Diagnosed recently with a small mass pancreas, had MRI yesterday   Tingling     HPI   57-year-old male with history of GERD and chronic nausea, fatigue, and decreased appetite for months, presenting for evaluation of a tingling weakness to his bilateral lower extremities that has been occurring intermittently for the last couple of weeks  Of note, patient is followed by GI for his chronic nausea  He has had recent endoscopies and colonoscopies with no findings to explain his symptoms  He has a gastric emptying study ordered for a couple of weeks from now  He had a CT scan performed of his abdomen pelvis a couple of weeks ago for his nausea, fatigue, and low appetite that was concerning for a pancreatic mass  He had an MRI that was performed yesterday for further evaluation of this  I was able to review the results, that showed a structure within or adjacent to the pancreatic tail which is more consistent with an accessory spleen than a primary pancreatic tumor, though it is recommended that this be confirmed with radionucleotide imaging  Patient also has a cyst within the pancreatic body with recommended follow-up in 2 years  Patient was just started on Prozac by his family physician for suspected component of anxiety in his symptoms  He reports that his fatigue is better since starting the Prozac, but he continues to have this tingling weakness      Patient describes his symptoms as an internal tingling or feeling of internal shaking that starts in his feet, travels up his legs to his abdomen, chest, and then to his jaw    States that when it occurs he feels generally weak all over and lightheaded  Symptoms only last for about 10 seconds at a time, and are accompanied by a flushing sensation  Denies ever passing out  No chest pain, palpitations, or shortness of breath with the symptoms  Denies focal weakness  Denies visible tremors when this occurs  He does not have numbness or tingling at baseline  No history of stroke  Patient's nausea is stable over the last few months  Not improved with Phenergan prescribed by his doctor  He denies any chest pain, exertional component, or diaphoresis  Prior to Admission Medications   Prescriptions Last Dose Informant Patient Reported? Taking? FLUoxetine (PROzac) 20 MG tablet  Self Yes Yes   Sig: Take 20 mg by mouth daily   pantoprazole (PROTONIX) 40 mg tablet  Self No Yes   Sig: Take 1 tablet (40 mg total) by mouth daily   promethazine (PHENERGAN) 12 5 MG tablet  Self No Yes   Sig: Take 2 tablets (25 mg total) by mouth every 8 (eight) hours as needed for nausea or vomiting      Facility-Administered Medications: None       Past Medical History:   Diagnosis Date    GERD (gastroesophageal reflux disease)        Past Surgical History:   Procedure Laterality Date    BACK SURGERY      COLONOSCOPY      FOOT SURGERY Right     hammer toe and bunions    DE COLONOSCOPY FLX DX W/COLLJ SPEC WHEN PFRMD N/A 7/25/2018    Procedure: EGD AND COLONOSCOPY;  Surgeon: Migdalia Guerra MD;  Location: [facility] GI [facility]; Service: Gastroenterology       Family History   Problem Relation Age of Onset    Lung cancer Mother      I have reviewed and agree with the history as documented  Social History   Substance Use Topics    Smoking status: Former Smoker    Smokeless tobacco: Never Used      Comment: did not smoke much     Alcohol use No        Review of Systems   Constitutional: Negative for chills and fever  HENT: Negative for congestion  Eyes: Negative for visual disturbance  Respiratory: Negative for cough and shortness of breath      Cardiovascular: Negative for chest pain and leg swelling  Gastrointestinal: Positive for nausea (chronic, unchanged)  Negative for abdominal pain, diarrhea and vomiting  Genitourinary: Negative for dysuria and frequency  Musculoskeletal: Negative for arthralgias, back pain, neck pain and neck stiffness  Skin: Negative for rash  Neurological: Positive for weakness (generalized, not present currently) and light-headedness (not present currently)  Negative for dizziness, tremors, seizures, syncope, facial asymmetry, speech difficulty, numbness and headaches  Psychiatric/Behavioral: Negative for agitation, behavioral problems and confusion  The patient is nervous/anxious  Physical Exam  Physical Exam   Constitutional: He is oriented to person, place, and time  He appears well-developed and well-nourished  No distress  HENT:   Head: Normocephalic and atraumatic  Right Ear: External ear normal    Left Ear: External ear normal    Nose: Nose normal    Mouth/Throat: Oropharynx is clear and moist    Eyes: Conjunctivae are normal    Neck: Normal range of motion  Neck supple  Cardiovascular: Normal rate, regular rhythm and normal heart sounds  Exam reveals no gallop and no friction rub  No murmur heard  Pulmonary/Chest: Effort normal and breath sounds normal  No respiratory distress  He has no wheezes  He has no rales  Abdominal: Soft  Bowel sounds are normal  He exhibits no distension  There is no tenderness  There is no guarding  Musculoskeletal: Normal range of motion  He exhibits no edema or deformity  Neurological: He is alert and oriented to person, place, and time  He exhibits normal muscle tone  Face symmetric, tongue midline, 5/5 strength in the proximal and distal upper and lower extremities bilaterally with intact sensation to light touch throughout  CN II-XII intact  Normal finger-to-nose, rapid alternating movements, and heel-to-shin bilaterally  Normal speech, normal gait   2+ patellar, achilles, and brachial reflexes bilaterally  No rigidity or ankle clonus  Skin: Skin is warm and dry  He is not diaphoretic  Psychiatric:   Mildly anxious       Vital Signs  ED Triage Vitals [08/21/18 1921]   Temperature Pulse Respirations Blood Pressure SpO2   98 3 °F (36 8 °C) 66 16 134/74 94 %      Temp Source Heart Rate Source Patient Position - Orthostatic VS BP Location FiO2 (%)   Oral Monitor Lying Right arm --      Pain Score       No Pain           Vitals:    08/21/18 2130 08/21/18 2200 08/21/18 2230 08/21/18 2300   BP: 130/73 132/75 127/72 123/75   Pulse: 56 56 (!) 54 (!) 54   Patient Position - Orthostatic VS:           Visual Acuity      ED Medications  Medications   prochlorperazine (COMPAZINE) tablet 5 mg (5 mg Oral Given 8/21/18 2006)       Diagnostic Studies  Results Reviewed     Procedure Component Value Units Date/Time    Troponin I [[id number]]  (Normal) Collected:  08/21/18 2244    Lab Status:  Final result Specimen:  Blood from Arm, Right Updated:  08/21/18 2308     Troponin I <0 02 ng/mL     Vitamin B12 [[id number]]  (Normal) Collected:  08/21/18 2000    Lab Status:  Final result Specimen:  Blood from Arm, Right Updated:  08/21/18 2240     Vitamin B-12 742 pg/mL     TSH [[id number]]  (Normal) Collected:  08/21/18 2000    Lab Status:  Final result Specimen:  Blood from Arm, Right Updated:  08/21/18 2032     TSH 3RD GENERATON 2 833 uIU/mL     Narrative:         Patients undergoing fluorescein dye angiography may retain small amounts of fluorescein in the body for 48-72 hours post procedure  Samples containing fluorescein can produce falsely depressed TSH values  If the patient had this procedure,a specimen should be resubmitted post fluorescein clearance      Magnesium [[id number]]  (Normal) Collected:  08/21/18 2000    Lab Status:  Final result Specimen:  Blood from Arm, Right Updated:  08/21/18 2032     Magnesium 2 0 mg/dL     Troponin I [[id number]]  (Normal) Collected:  08/21/18 2000    Lab Status:  Final result Specimen:  Blood from Arm, Right Updated:  08/21/18 2025     Troponin I <0 02 ng/mL     Comprehensive metabolic panel [[id number]] Collected:  08/21/18 2000    Lab Status:  Final result Specimen:  Blood from Arm, Right Updated:  08/21/18 2023     Sodium 139 mmol/L      Potassium 3 6 mmol/L      Chloride 106 mmol/L      CO2 26 mmol/L      Anion Gap 7 mmol/L      BUN 15 mg/dL      Creatinine 1 02 mg/dL      Glucose 103 mg/dL      Calcium 8 7 mg/dL      AST 22 U/L      ALT 26 U/L      Alkaline Phosphatase 79 U/L      Total Protein 7 0 g/dL      Albumin 3 5 g/dL      Total Bilirubin 0 70 mg/dL      eGFR 77 ml/min/1 73sq m     Narrative:         National Kidney Disease Education Program recommendations are as follows:  GFR calculation is accurate only with a steady state creatinine  Chronic Kidney disease less than 60 ml/min/1 73 sq  meters  Kidney failure less than 15 ml/min/1 73 sq  meters      CBC and differential [[id number]] Collected:  08/21/18 2000    Lab Status:  Final result Specimen:  Blood from Arm, Right Updated:  08/21/18 2009     WBC 7 61 Thousand/uL      RBC 4 96 Million/uL      Hemoglobin 14 5 g/dL      Hematocrit 42 4 %      MCV 86 fL      MCH 29 2 pg      MCHC 34 2 g/dL      RDW 12 3 %      MPV 9 8 fL      Platelets 014 Thousands/uL      nRBC 0 /100 WBCs      Neutrophils Relative 63 %      Immat GRANS % 0 %      Lymphocytes Relative 20 %      Monocytes Relative 12 %      Eosinophils Relative 4 %      Basophils Relative 1 %      Neutrophils Absolute 4 86 Thousands/µL      Immature Grans Absolute 0 01 Thousand/uL      Lymphocytes Absolute 1 51 Thousands/µL      Monocytes Absolute 0 89 Thousand/µL      Eosinophils Absolute 0 28 Thousand/µL      Basophils Absolute 0 06 Thousands/µL                  No orders to display              Procedures  Procedures       Phone Contacts  ED Phone Contact    ED Course                               MDM  Number of Diagnoses or Management Options  Generalized weakness: new and requires workup  Tingling in extremities: new and requires workup  Diagnosis management comments: Patient is generally well appearing  Afebrile and hemodynamically stable  Reports "tingling weakness" that occurs in discrete episodes as described above in the HPI  Thorough neurologic exam performed that is unremarkable as above  Patient has normal deep tendon reflexes, sensation, and strength, and I doubt Guillain-[place], MS, CVA, or neuro degenerative condition as the cause of these discrete episodes  Labs with normal electrolytes including magnesium and calcium  Doubt electrolyte disturbance as the cause of his symptoms  Vitamin B12 level is within normal limits  CBC with no leukocytosis and normal hemoglobin  Patient denies infectious symptoms, and I doubt underlying infection as the cause of his symptoms  I personally interpreted the patient's EKG, which shows normal rate, normal sinus rhythm, normal axis, a right ventricular conduction delay, no ischemic changes, with no prior available for comparison  Patient denies chest pain or shortness of breath, but given fatigue, generalized weakness, and lightheadedness that occurs momentarily with the symptoms, delta troponins were obtained that are unremarkable  Doubt underlying cardiac etiology as the cause of the patient's symptoms  I have discussed with the patient and his wife at bedside that I am uncertain as to the cause of his symptoms, but that I doubt any life-threatening etiology  I recommended that he follow up with his primary care provider if the symptoms persist for further testing  Return precautions discussed for focal weakness, focal sensory deficits, dizziness, chest pain, or shortness of breath  Patient discharged in good condition           Amount and/or Complexity of Data Reviewed  Clinical lab tests: ordered and reviewed  Tests in the radiology section of CPT®: ordered and reviewed  Independent visualization of images, tracings, or specimens: yes    Patient Progress  Patient progress: stable    CritCare Time       Disposition  Final diagnoses:   Tingling in extremities   Generalized weakness     Time reflects when diagnosis was documented in both MDM as applicable and the Disposition within this note     Time User Action Codes Description Comment    8/21/2018 11:36 PM Binh Chavezigh Add [R20 2] Tingling in extremities     8/21/2018 11:36 PM Binh Chavezigh Add [R53 1] Generalized weakness       ED Disposition     ED Disposition Condition Comment    Discharge  Bry Cecil discharge to home/self care  Condition at discharge: Good        Follow-up Information     Follow up With Specialties Details Why Contact Info Additional [address], DO Family Medicine In 1 week If symptoms persist   [address]  [phone]       [place] [address] Emergency Department Emergency Medicine  As needed, If symptoms worsen [address] [facility]  AN ED,  [address], [place], [address]          Patient's Medications   Discharge Prescriptions    No medications on file     No discharge procedures on file      ED Provider  Electronically Signed by           Torsten Forrest MD  08/21/18 0116

## 2018-08-22 LAB
ATRIAL RATE: 58 BPM
P AXIS: 66 DEGREES
PR INTERVAL: 158 MS
QRS AXIS: 47 DEGREES
QRSD INTERVAL: 84 MS
QT INTERVAL: 428 MS
QTC INTERVAL: 413 MS
T WAVE AXIS: 34 DEGREES
VENTRICULAR RATE: 56 BPM

## 2018-08-22 PROCEDURE — 93010 ELECTROCARDIOGRAM REPORT: CPT | Performed by: INTERNAL MEDICINE

## 2018-08-22 NOTE — DISCHARGE INSTRUCTIONS
Weakness   WHAT YOU NEED TO KNOW:   Weakness is a loss of muscle strength  It may be caused by brain, nerve, or muscle problems  Physical and mental conditions such as heart problems, pregnancy, dehydration, or depression may also cause weakness  Reactions to certain drugs can cause weakness  Parts of your body may become weak if you need to wear a cast or splint or have been on bed rest for a long time  DISCHARGE INSTRUCTIONS:   Call 911 for any of the following:   · You have any of the following signs of a stroke:      ¨ Numbness or drooping on one side of your face     ¨ Weakness in an arm or leg    ¨ Confusion or difficulty speaking    ¨ Dizziness, a severe headache, or vision loss    · You lose feeling in your weakened body area  · You have electric shock-like feelings down your arms and legs when you flex or move your neck  · You have sudden or increased trouble speaking, swallowing, or breathing  Seek care immediately if:   · You have severe pain in your back, arms, or legs that worsens  · You have sudden or worsened muscle weakness or loss of movement  · You are not able to control when you urinate or have a bowel movement  Contact your healthcare provider if:   · You feel depressed or anxious  · You have questions or concerns about your condition or care  Manage weakness:   · Use assistive devices as directed  These help protect you from injury  Examples include a walker or cane  Have someone install handrails in your home  These will help you get out of a bathtub or stand up from a toilet  Use a shower chair so you can sit while you shower  Sit down on the toilet or another chair to dry off and put on your clothes  Get help going up and down stairs if your legs are weak  · Go to physical or occupational therapy if directed  A physical therapist can teach you exercises to help strengthen weak muscles   An occupational therapist can show you ways to do your daily activities more easily  For example, light forks and spoons can be easier to use if you have hand weakness  You may also learn ways to organize your household items so you are not moving heavy items  · Balance rest with exercise  Exercise can help increase your muscle strength and energy  Do not exercise for long periods at a time  Take breaks often to rest  Too much exercise can cause muscle strain or make you more tired  Ask your healthcare provider how much exercise is right for you  · Eat a variety of healthy foods  Too much or too little food may cause weakness or tiredness  Ask your healthcare provider what a healthy amount of food is for you  Healthy foods include fruits, vegetables, whole-grain breads, low-fat dairy products, lean meats and fish, nuts, and cooked beans  · Do not smoke  Nicotine and other chemicals in cigarettes and cigars can make your symptoms worse, and can cause lung damage  Ask your healthcare provider for information if you currently smoke and need help to quit  E-cigarettes or smokeless tobacco still contain nicotine  Talk to your healthcare provider before you use these products  · Do not use caffeine, alcohol, or illegal drugs  These may cause muscle twitching, which could lead to worsened weakness  Follow up with your healthcare provider as directed:  Write down your questions so you remember to ask them during your visits  © 2017 [employer]  Information is for End User's use only and may not be sold, redistributed or otherwise used for commercial purposes  All illustrations and images included in CareNotes® are the copyrighted property of [employer]  or [employer]  The above information is an  only  It is not intended as medical advice for individual conditions or treatments  Talk to your doctor, nurse or pharmacist before following any medical regimen to see if it is safe and effective for you

## 2018-08-23 ENCOUNTER — HOSPITAL ENCOUNTER (OUTPATIENT)
Dept: RADIOLOGY | Facility: HOSPITAL | Age: 68
Discharge: HOME | End: 2018-08-23
Attending: INTERNAL MEDICINE
Payer: COMMERCIAL

## 2018-08-23 DIAGNOSIS — R11.2 NAUSEA AND VOMITING IN ADULT PATIENT: ICD-10-CM

## 2018-08-23 PROCEDURE — A9541 TC99M SULFUR COLLOID: HCPCS

## 2018-08-23 PROCEDURE — 78264 GASTRIC EMPTYING IMG STUDY: CPT

## 2018-08-31 DIAGNOSIS — K86.89 PANCREATIC MASS: Primary | ICD-10-CM

## 2018-09-07 ENCOUNTER — HOSPITAL ENCOUNTER (OUTPATIENT)
Dept: NUCLEAR MEDICINE | Facility: HOSPITAL | Age: 68
Discharge: HOME | End: 2018-09-07
Payer: MEDICARE

## 2018-09-07 DIAGNOSIS — K86.89 PANCREATIC MASS: ICD-10-CM

## 2018-09-07 PROCEDURE — A9541 TC99M SULFUR COLLOID: HCPCS

## 2018-09-07 PROCEDURE — 78205 HB LIVER IMAGING (3D): CPT

## 2018-09-10 ENCOUNTER — OFFICE VISIT (OUTPATIENT)
Dept: GASTROENTEROLOGY | Facility: MEDICAL CENTER | Age: 68
End: 2018-09-10
Payer: MEDICARE

## 2018-09-10 VITALS
HEART RATE: 77 BPM | SYSTOLIC BLOOD PRESSURE: 122 MMHG | DIASTOLIC BLOOD PRESSURE: 70 MMHG | BODY MASS INDEX: 26.68 KG/M2 | TEMPERATURE: 97.6 F | HEIGHT: 67 IN | WEIGHT: 170 LBS

## 2018-09-10 DIAGNOSIS — K86.89 PANCREATIC MASS: ICD-10-CM

## 2018-09-10 DIAGNOSIS — R11.0 NAUSEA: Primary | ICD-10-CM

## 2018-09-10 DIAGNOSIS — D12.6 ADENOMATOUS POLYP OF COLON, UNSPECIFIED PART OF COLON: ICD-10-CM

## 2018-09-10 DIAGNOSIS — R63.4 WEIGHT LOSS: ICD-10-CM

## 2018-09-10 DIAGNOSIS — R63.0 LOSS OF APPETITE: ICD-10-CM

## 2018-09-10 DIAGNOSIS — R10.12 LEFT UPPER QUADRANT PAIN: ICD-10-CM

## 2018-09-10 PROCEDURE — 99214 OFFICE O/P EST MOD 30 MIN: CPT | Performed by: INTERNAL MEDICINE

## 2018-09-10 NOTE — PROGRESS NOTES
[facility] - Outpatient Follow-up Note  Diego Archer 74 y o  male MRN: [id number]  Encounter: [id number]          ASSESSMENT AND PLAN:      1  Nausea  2  Loss of appetite  3  Weight loss  4  Left upper quadrant pain  5  Pancreatic mass  His symptoms are most likely due to functional dyspepsia, but since his nausea has persisted and he has had this weight loss and since the liver-spleen scan was not diagnostic of a splenule, I will proceed to an endoscopic ultrasound as the next step  Depending on the appearance and size of this lesion I may perform a fine-needle aspiration  In the meantime I have asked him to continue the Protonix and Phenergan and to eat small frequent meals and avoid late-night meals because of the delayed gastric emptying   - Case request operating room: LINEAR ENDOSCOPIC U/S    6  Colon polyps  Because of his history of colon polyps, his next colonoscopy should be in five years which would be 2023, or sooner if clinically indicated     ______________________________________________________________________    SUBJECTIVE:  He presents for follow-up after his recent upper endoscopy and colonoscopy and his recent gastric emptying study  He was noted to have gastritis, colon polyps, and mild delay to gastric emptying  He continues to have nausea and has had some mild weight loss of about 15 lb over the past six months  He feels Protonix and Phenergan her helping but he continues to have some nausea symptoms and decreased appetite  Recently he had a liver spleen scan because his MRI suggested this lesion near his pancreatic tail may be a splenule, but the exam was not consistent with a splenule  REVIEW OF SYSTEMS IS OTHERWISE NEGATIVE        Historical Information   Past Medical History:   Diagnosis Date    GERD (gastroesophageal reflux disease)      Past Surgical History:   Procedure Laterality Date    BACK SURGERY      COLONOSCOPY      FOOT SURGERY Right     hammer toe and bunions    DC COLONOSCOPY FLX DX W/COLLJ SPEC WHEN PFRMD N/A 7/25/2018    Procedure: EGD AND COLONOSCOPY;  Surgeon: Joesph Ibarra MD;  Location: [facility]; Service: Gastroenterology     Social History   History   Alcohol Use No     History   Drug Use No     History   Smoking Status    Former Smoker   Smokeless Tobacco    Never Used     Comment: did not smoke much      Family History   Problem Relation Age of Onset    Lung cancer Mother        Meds/Allergies       Current Outpatient Prescriptions:     FLUoxetine (PROzac) 20 MG tablet    pantoprazole (PROTONIX) 40 mg tablet    promethazine (PHENERGAN) 12 5 MG tablet    No Known Allergies        Objective     Blood pressure 122/70, pulse 77, temperature 97 6 °F (36 4 °C), temperature source Tympanic, height 5' 7" (1 702 m), weight 77 1 kg (170 lb)  Body mass index is 26 63 kg/m²  PHYSICAL EXAM:      General Appearance:   Alert, cooperative, no distress   HEENT:   Normocephalic, atraumatic, anicteric      Neck:  Supple, symmetrical, trachea midline   Lungs:   Clear to auscultation bilaterally; no rales, rhonchi or wheezing; respirations unlabored    Heart[de-identified]   Regular rate and rhythm; no murmur, rub, or gallop  Abdomen:   Soft, non-tender, non-distended; normal bowel sounds; no masses, no organomegaly    Genitalia:   Deferred    Rectal:   Deferred    Extremities:  No cyanosis, clubbing or edema    Pulses:  2+ and symmetric    Skin:  No jaundice, rashes, or lesions    Lymph nodes:  No palpable cervical lymphadenopathy        Lab Results:   No visits with results within 1 Day(s) from this visit     Latest known visit with results is:   Admission on 08/21/2018, Discharged on 08/21/2018   Component Date Value    WBC 08/21/2018 7 61     RBC 08/21/2018 4 96     Hemoglobin 08/21/2018 14 5     Hematocrit 08/21/2018 42 4     MCV 08/21/2018 86     MCH 08/21/2018 29 2     MCHC 08/21/2018 34 2     RDW 08/21/2018 12 3     MPV 08/21/2018 9 8     Platelets 31/74/5082 177     nRBC 08/21/2018 0     Neutrophils Relative 08/21/2018 63     Immat GRANS % 08/21/2018 0     Lymphocytes Relative 08/21/2018 20     Monocytes Relative 08/21/2018 12     Eosinophils Relative 08/21/2018 4     Basophils Relative 08/21/2018 1     Neutrophils Absolute 08/21/2018 4 86     Immature Grans Absolute 08/21/2018 0 01     Lymphocytes Absolute 08/21/2018 1 51     Monocytes Absolute 08/21/2018 0 89     Eosinophils Absolute 08/21/2018 0 28     Basophils Absolute 08/21/2018 0 06     Sodium 08/21/2018 139     Potassium 08/21/2018 3 6     Chloride 08/21/2018 106     CO2 08/21/2018 26     ANION GAP 08/21/2018 7     BUN 08/21/2018 15     Creatinine 08/21/2018 1 02     Glucose 08/21/2018 103     Calcium 08/21/2018 8 7     AST 08/21/2018 22     ALT 08/21/2018 26     Alkaline Phosphatase 08/21/2018 79     Total Protein 08/21/2018 7 0     Albumin 08/21/2018 3 5     Total Bilirubin 08/21/2018 0 70     eGFR 08/21/2018 77     TSH 3RD GENERATON 08/21/2018 2 833     Vitamin B-12 08/21/2018 742     Troponin I 08/21/2018 <0 02     Magnesium 08/21/2018 2 0     Troponin I 08/21/2018 <0 02     Ventricular Rate 08/21/2018 56     Atrial Rate 08/21/2018 58     IN Interval 08/21/2018 158     QRSD Interval 08/21/2018 84     QT Interval 08/21/2018 428     QTC Interval 08/21/2018 413     P Axis 08/21/2018 66     QRS Axis 08/21/2018 47     T Wave Axis 08/21/2018 34          Radiology Results:   Nm Liver Spect    Result Date: 9/7/2018  Narrative: LIVER AND SPLEEN SCAN INDICATION: Pancreatic mass versus splenule  COMPARISON:  MRI from 8/20/2018, CT from 8/10/2018 TECHNIQUE:   The study was performed following the intravenous administration of 4 5 mCi Tc-99m labeled sulfur colloid  Static images were acquired in the anterior, posterior, bilateral oblique and lateral positions    SPECT imaging was performed and reconstructed into the transaxial, coronal sagittal planes  FINDINGS: There is normal and homogeneous distribution of the radiopharmaceutical throughout the liver and spleen without any evidence of a focal cold or hot nodule  Both organs have normal size and morphology  There is no significant colloid shift  No extrasplenic foci of uptake identified  SPECT imaging demonstrates no focal radiotracer uptake at the level of the pancreatic lesion  Impression: 1  No focal radiotracer uptake at the pancreatic tail lesion  At the minimum continued follow-up is advised  Workstation performed: [id number]     Nm Gastric Emptying    Result Date: 8/23/2018  Narrative: GASTRIC EMPTYING STUDY INDICATION:  Abdominal pain R11 2: Nausea with vomiting, unspecified COMPARISON:  None available TECHNIQUE:   The study was performed following the oral administration of 1 03 mCi Tc-99m sulfur colloid combined with scrambled eggs, as part of a standard meal   Following the meal, one minute anterior and posterior images were obtained immediately and  at 0 25 hours, 0 5 hour, 1 hour, 1 5 hour, 2 hour, 3 hour and 4 hour intervals from the time of ingestion  Geometric mean analyses were then performed  As of March 1, 2016, this gastric emptying protocol has been modified and updated  The gastric emptying times and the normal values reported below reflect the change in protocol   FINDINGS: Gastric emptying at 0 5 hours = 12% (N < 30%) Gastric emptying at 1 hour = 23 % (N = 10 - 70%) Gastric emptying at 2 hours = 44 % (N = > 40%) Gastric emptying at 3 hours = 65 % (N = > 70%) Gastric emptying at 4 hours = 87 % (N = >90%) Linear T-1/2 = 146 minutes     Impression: There is a mildly delayed rate of gastric emptying of solids Workstation performed: [id number]     Mri Abdomen W Wo Contrast And Mrcp    Result Date: 8/21/2018  Narrative: MRI OF THE ABDOMEN (PANCREAS) WITH AND WITHOUT CONTRAST INDICATION:  Suggestion of pancreatic tail mass on recent CT  COMPARISON: CT of the abdomen and pelvis from August 10, 2018  TECHNIQUE:  The following pulse sequences were obtained on a 1 5 T scanner:  Coronal and axial T2 with TE of 90 and 180 respectively, axial T2 with fat saturation, axial FIESTA fat-sat, axial T1-weighted in-and-out-of phase, axial DWI/ADC, pre-contrast axial T1 with fat saturation, post-contrast dynamic axial T1 with fat saturation at 20, 70, and 180 seconds, followed by coronal T1 with fat saturation and 7-minute delayed axial T1 with fat-saturation  IV Contrast:  8 mL of gadobutrol injection (MULTI-DOSE) FINDINGS: PANCREAS:  General: Normal in morphology and signal intensity  Lesions: 1 3 x 1 9 cm ovoid structure within or adjacent to the tail of the pancreas, isointense with spleen on all pulse sequences (see, for example series 601, image 16; series 1101, image 200; series 1401, image 24)  This appearance is 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) than a pancreatic tumor  3 mm cyst in the mid pancreatic body, best seen on MRCP images (series 901, image 83; series 1001, image 4)  Duct: Normal in course and caliber  Specifically, no mass effect on the duct in the distal pancreatic tail  LIVER:   1 2 cm cyst in the right lobe, adjacent to the gallbladder fossa  Additional subcentimeter cysts  No evidence of solid mass  Liver normal in signal and size  BILIARY TREE:  Normal  GALLBLADDER:  Normal  SPLEEN: Normal  KIDNEYS:  0 6 x 1 2 cm cyst in the posterior mid right kidney  No other evidence of renal mass  No hydronephrosis  ADRENALS:  Normal  ABDOMINAL CAVITY:  No lymphadenopathy or mass  No ascites  BOWEL:  Unremarkable MRI appearance  OSSEOUS STRUCTURES:  Normal marrow signal and enhancement  No evidence of recent fracture or marrow replacing process  VASCULAR STRUCTURES:  Unremarkable  ABDOMINAL WALL:  Normal  LOWER CHEST:  Unremarkable MRI appearance       Impression: 1   1 3 x 1 9 cm structure within or adjacent to the pancreatic tail, the appearance of which is 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

## 2018-09-10 NOTE — LETTER
September 10, 2018     DO Mike Preston [facility]  [address] [address]    Patient: Bernadette Duran   YOB: 1950   Date of Visit: 9/10/2018       Dear Dr Laura Ballesteros: Thank you for referring Maye Dominguez to me for evaluation  Below are my notes for this consultation  If you have questions, please do not hesitate to call me  I look forward to following your patient along with you  Sincerely,        Kierra Perea MD        CC: No Recipients  Kierra Perea MD  9/10/2018  9:59 AM  Sign at close encounter  [facility] - Outpatient Follow-up Note  Bernadette Duran 74 y o  male MRN: [id number]  Encounter: [id number]          ASSESSMENT AND PLAN:      1  Nausea  2  Loss of appetite  3  Weight loss  4  Left upper quadrant pain  5  Pancreatic mass  His symptoms are most likely due to functional dyspepsia, but since his nausea has persisted and he has had this weight loss and since the liver-spleen scan was not diagnostic of a splenule, I will proceed to an endoscopic ultrasound as the next step  Depending on the appearance and size of this lesion I may perform a fine-needle aspiration  In the meantime I have asked him to continue the Protonix and Phenergan and to eat small frequent meals and avoid late-night meals because of the delayed gastric emptying   - Case request operating room: LINEAR ENDOSCOPIC U/S    6  Colon polyps  Because of his history of colon polyps, his next colonoscopy should be in five years which would be 2023, or sooner if clinically indicated     ______________________________________________________________________    SUBJECTIVE:  He presents for follow-up after his recent upper endoscopy and colonoscopy and his recent gastric emptying study  He was noted to have gastritis, colon polyps, and mild delay to gastric emptying  He continues to have nausea and has had some mild weight loss of about 15 lb over the past six months     He feels Protonix and Phenergan her helping but he continues to have some nausea symptoms and decreased appetite  Recently he had a liver spleen scan because his MRI suggested this lesion near his pancreatic tail may be a splenule, but the exam was not consistent with a splenule  REVIEW OF SYSTEMS IS OTHERWISE NEGATIVE  Historical Information   Past Medical History:   Diagnosis Date    GERD (gastroesophageal reflux disease)      Past Surgical History:   Procedure Laterality Date    BACK SURGERY      COLONOSCOPY      FOOT SURGERY Right     hammer toe and bunions    NE COLONOSCOPY FLX DX W/COLLJ SPEC WHEN PFRMD N/A 7/25/2018    Procedure: EGD AND COLONOSCOPY;  Surgeon: Marysol Machado MD;  Location: [facility] GI LAB; Service: Gastroenterology     Social History   History   Alcohol Use No     History   Drug Use No     History   Smoking Status    Former Smoker   Smokeless Tobacco    Never Used     Comment: did not smoke much      Family History   Problem Relation Age of Onset    Lung cancer Mother        Meds/Allergies       Current Outpatient Prescriptions:     FLUoxetine (PROzac) 20 MG tablet    pantoprazole (PROTONIX) 40 mg tablet    promethazine (PHENERGAN) 12 5 MG tablet    No Known Allergies        Objective     Blood pressure 122/70, pulse 77, temperature 97 6 °F (36 4 °C), temperature source Tympanic, height 5' 7" (1 702 m), weight 77 1 kg (170 lb)  Body mass index is 26 63 kg/m²  PHYSICAL EXAM:      General Appearance:   Alert, cooperative, no distress   HEENT:   Normocephalic, atraumatic, anicteric      Neck:  Supple, symmetrical, trachea midline   Lungs:   Clear to auscultation bilaterally; no rales, rhonchi or wheezing; respirations unlabored    Heart[de-identified]   Regular rate and rhythm; no murmur, rub, or gallop     Abdomen:   Soft, non-tender, non-distended; normal bowel sounds; no masses, no organomegaly    Genitalia:   Deferred    Rectal:   Deferred    Extremities:  No cyanosis, clubbing or edema    Pulses:  2+ and symmetric    Skin:  No jaundice, rashes, or lesions    Lymph nodes:  No palpable cervical lymphadenopathy        Lab Results:   No visits with results within 1 Day(s) from this visit     Latest known visit with results is:   Admission on 08/21/2018, Discharged on 08/21/2018   Component Date Value    WBC 08/21/2018 7 61     RBC 08/21/2018 4 96     Hemoglobin 08/21/2018 14 5     Hematocrit 08/21/2018 42 4     MCV 08/21/2018 86     MCH 08/21/2018 29 2     MCHC 08/21/2018 34 2     RDW 08/21/2018 12 3     MPV 08/21/2018 9 8     Platelets 18/67/9326 177     nRBC 08/21/2018 0     Neutrophils Relative 08/21/2018 63     Immat GRANS % 08/21/2018 0     Lymphocytes Relative 08/21/2018 20     Monocytes Relative 08/21/2018 12     Eosinophils Relative 08/21/2018 4     Basophils Relative 08/21/2018 1     Neutrophils Absolute 08/21/2018 4 86     Immature Grans Absolute 08/21/2018 0 01     Lymphocytes Absolute 08/21/2018 1 51     Monocytes Absolute 08/21/2018 0 89     Eosinophils Absolute 08/21/2018 0 28     Basophils Absolute 08/21/2018 0 06     Sodium 08/21/2018 139     Potassium 08/21/2018 3 6     Chloride 08/21/2018 106     CO2 08/21/2018 26     ANION GAP 08/21/2018 7     BUN 08/21/2018 15     Creatinine 08/21/2018 1 02     Glucose 08/21/2018 103     Calcium 08/21/2018 8 7     AST 08/21/2018 22     ALT 08/21/2018 26     Alkaline Phosphatase 08/21/2018 79     Total Protein 08/21/2018 7 0     Albumin 08/21/2018 3 5     Total Bilirubin 08/21/2018 0 70     eGFR 08/21/2018 77     TSH 3RD GENERATON 08/21/2018 2 833     Vitamin B-12 08/21/2018 742     Troponin I 08/21/2018 <0 02     Magnesium 08/21/2018 2 0     Troponin I 08/21/2018 <0 02     Ventricular Rate 08/21/2018 56     Atrial Rate 08/21/2018 58     RI Interval 08/21/2018 158     QRSD Interval 08/21/2018 84     QT Interval 08/21/2018 428     QTC Interval 08/21/2018 413     P Axis 08/21/2018 66     QRS Axis 08/21/2018 47     T Wave Axis 08/21/2018 34          Radiology Results:   Nm Liver Spect    Result Date: 9/7/2018  Narrative: LIVER AND SPLEEN SCAN INDICATION: Pancreatic mass versus splenule  COMPARISON:  MRI from 8/20/2018, CT from 8/10/2018 TECHNIQUE:   The study was performed following the intravenous administration of 4 5 mCi Tc-99m labeled sulfur colloid  Static images were acquired in the anterior, posterior, bilateral oblique and lateral positions  SPECT imaging was performed and reconstructed into the transaxial, coronal sagittal planes  FINDINGS: There is normal and homogeneous distribution of the radiopharmaceutical throughout the liver and spleen without any evidence of a focal cold or hot nodule  Both organs have normal size and morphology  There is no significant colloid shift  No extrasplenic foci of uptake identified  SPECT imaging demonstrates no focal radiotracer uptake at the level of the pancreatic lesion  Impression: 1  No focal radiotracer uptake at the pancreatic tail lesion  At the minimum continued follow-up is advised  Workstation performed: [id number]     Nm Gastric Emptying    Result Date: 8/23/2018  Narrative: GASTRIC EMPTYING STUDY INDICATION:  Abdominal pain R11 2: Nausea with vomiting, unspecified COMPARISON:  None available TECHNIQUE:   The study was performed following the oral administration of 1 03 mCi Tc-99m sulfur colloid combined with scrambled eggs, as part of a standard meal   Following the meal, one minute anterior and posterior images were obtained immediately and  at 0 25 hours, 0 5 hour, 1 hour, 1 5 hour, 2 hour, 3 hour and 4 hour intervals from the time of ingestion  Geometric mean analyses were then performed  As of March 1, 2016, this gastric emptying protocol has been modified and updated  The gastric emptying times and the normal values reported below reflect the change in protocol   FINDINGS: Gastric emptying at 0 5 hours = 12% (N < 30%) Gastric emptying at 1 hour = 23 % (N = 10 - 70%) Gastric emptying at 2 hours = 44 % (N = > 40%) Gastric emptying at 3 hours = 65 % (N = > 70%) Gastric emptying at 4 hours = 87 % (N = >90%) Linear T-1/2 = 146 minutes     Impression: There is a mildly delayed rate of gastric emptying of solids Workstation performed: [id number]     Mri Abdomen W Wo Contrast And Mrcp    Result Date: 8/21/2018  Narrative: MRI OF THE ABDOMEN (PANCREAS) WITH AND WITHOUT CONTRAST INDICATION:  Suggestion of pancreatic tail mass on recent CT  COMPARISON: CT of the abdomen and pelvis from August 10, 2018  TECHNIQUE:  The following pulse sequences were obtained on a 1 5 T scanner:  Coronal and axial T2 with TE of 90 and 180 respectively, axial T2 with fat saturation, axial FIESTA fat-sat, axial T1-weighted in-and-out-of phase, axial DWI/ADC, pre-contrast axial T1 with fat saturation, post-contrast dynamic axial T1 with fat saturation at 20, 70, and 180 seconds, followed by coronal T1 with fat saturation and 7-minute delayed axial T1 with fat-saturation  IV Contrast:  8 mL of gadobutrol injection (MULTI-DOSE) FINDINGS: PANCREAS:  General: Normal in morphology and signal intensity  Lesions: 1 3 x 1 9 cm ovoid structure within or adjacent to the tail of the pancreas, isointense with spleen on all pulse sequences (see, for example series 601, image 16; series 1101, image 200; series 1401, image 24)  This appearance is 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) than a pancreatic tumor  3 mm cyst in the mid pancreatic body, best seen on MRCP images (series 901, image 83; series 1001, image 4)  Duct: Normal in course and caliber  Specifically, no mass effect on the duct in the distal pancreatic tail  LIVER:   1 2 cm cyst in the right lobe, adjacent to the gallbladder fossa  Additional subcentimeter cysts  No evidence of solid mass  Liver normal in signal and size    BILIARY TREE:  Normal  GALLBLADDER:  Normal  SPLEEN: Normal  KIDNEYS: 0 6 x 1 2 cm cyst in the posterior mid right kidney  No other evidence of renal mass  No hydronephrosis  ADRENALS:  Normal  ABDOMINAL CAVITY:  No lymphadenopathy or mass  No ascites  BOWEL:  Unremarkable MRI appearance  OSSEOUS STRUCTURES:  Normal marrow signal and enhancement  No evidence of recent fracture or marrow replacing process  VASCULAR STRUCTURES:  Unremarkable  ABDOMINAL WALL:  Normal  LOWER CHEST:  Unremarkable MRI appearance  Impression: 1   1 3 x 1 9 cm structure within or adjacent to the pancreatic tail, the appearance of which is 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

## 2018-09-24 ENCOUNTER — TRANSCRIBE ORDERS (OUTPATIENT)
Dept: ADMINISTRATIVE | Age: 68
End: 2018-09-24

## 2018-09-24 ENCOUNTER — APPOINTMENT (OUTPATIENT)
Dept: LAB | Age: 68
End: 2018-09-24
Payer: MEDICARE

## 2018-09-24 DIAGNOSIS — R53.83 FATIGUE, UNSPECIFIED TYPE: Primary | ICD-10-CM

## 2018-09-24 DIAGNOSIS — R53.83 FATIGUE, UNSPECIFIED TYPE: ICD-10-CM

## 2018-09-24 PROCEDURE — 36415 COLL VENOUS BLD VENIPUNCTURE: CPT

## 2018-09-24 PROCEDURE — 86618 LYME DISEASE ANTIBODY: CPT

## 2018-09-26 LAB
B BURGDOR IGG SER IA-ACNC: 0.29
B BURGDOR IGM SER IA-ACNC: 0.17

## 2018-10-15 ENCOUNTER — ANESTHESIA EVENT (OUTPATIENT)
Dept: GASTROENTEROLOGY | Facility: HOSPITAL | Age: 68
End: 2018-10-15
Payer: MEDICARE

## 2018-10-15 ENCOUNTER — ANESTHESIA (OUTPATIENT)
Dept: GASTROENTEROLOGY | Facility: HOSPITAL | Age: 68
End: 2018-10-15
Payer: MEDICARE

## 2018-10-15 ENCOUNTER — HOSPITAL ENCOUNTER (OUTPATIENT)
Facility: HOSPITAL | Age: 68
Setting detail: OUTPATIENT SURGERY
Discharge: HOME | End: 2018-10-15
Attending: INTERNAL MEDICINE | Admitting: INTERNAL MEDICINE
Payer: MEDICARE

## 2018-10-15 VITALS
HEIGHT: 67 IN | DIASTOLIC BLOOD PRESSURE: 62 MMHG | RESPIRATION RATE: 16 BRPM | OXYGEN SATURATION: 98 % | BODY MASS INDEX: 25.9 KG/M2 | SYSTOLIC BLOOD PRESSURE: 124 MMHG | TEMPERATURE: 97.1 F | WEIGHT: 165 LBS | HEART RATE: 60 BPM

## 2018-10-15 PROCEDURE — 43237 ENDOSCOPIC US EXAM ESOPH: CPT | Performed by: INTERNAL MEDICINE

## 2018-10-15 RX ORDER — SODIUM CHLORIDE 9 MG/ML
50 INJECTION, SOLUTION INTRAVENOUS CONTINUOUS
Status: DISCONTINUED | OUTPATIENT
Start: 2018-10-15 | End: 2018-10-15 | Stop reason: HOSPADM

## 2018-10-15 RX ORDER — PROPOFOL 10 MG/ML
INJECTION, EMULSION INTRAVENOUS AS NEEDED
Status: DISCONTINUED | OUTPATIENT
Start: 2018-10-15 | End: 2018-10-15 | Stop reason: SURG

## 2018-10-15 RX ORDER — PROPOFOL 10 MG/ML
INJECTION, EMULSION INTRAVENOUS CONTINUOUS PRN
Status: DISCONTINUED | OUTPATIENT
Start: 2018-10-15 | End: 2018-10-15

## 2018-10-15 RX ORDER — SODIUM CHLORIDE 9 MG/ML
INJECTION, SOLUTION INTRAVENOUS CONTINUOUS PRN
Status: DISCONTINUED | OUTPATIENT
Start: 2018-10-15 | End: 2018-10-15

## 2018-10-15 RX ADMIN — SODIUM CHLORIDE: 0.9 INJECTION, SOLUTION INTRAVENOUS at 14:34

## 2018-10-15 RX ADMIN — PROPOFOL 100 MCG/KG/MIN: 10 INJECTION, EMULSION INTRAVENOUS at 14:11

## 2018-10-15 RX ADMIN — SODIUM CHLORIDE: 0.9 INJECTION, SOLUTION INTRAVENOUS at 13:14

## 2018-10-15 RX ADMIN — LIDOCAINE HYDROCHLORIDE 100 MG: 20 INJECTION, SOLUTION INTRAVENOUS at 14:11

## 2018-10-15 RX ADMIN — PROPOFOL 150 MG: 10 INJECTION, EMULSION INTRAVENOUS at 14:11

## 2018-10-15 NOTE — ANESTHESIA POSTPROCEDURE EVALUATION
Post-Op Assessment Note      CV Status:  Stable    Mental Status:  Alert and awake    Hydration Status:  Euvolemic    PONV Controlled:  Controlled    Airway Patency:  Patent    Post Op Vitals Reviewed: Yes          Staff: CRNA           /72 (10/15/18 1441)    Temp     Pulse 60 (10/15/18 1441)   Resp 18 (10/15/18 1441)    SpO2 98 % (10/15/18 1441)

## 2018-10-15 NOTE — H&P
History and Physical - [facility]  Mauricio Mcnair 79 y o  male MRN: [id number]                  HPI: Mauricio Mcnair is a 79y o  year old male who presents for splenule versus pancreatic mass  REVIEW OF SYSTEMS: Per the HPI, and otherwise unremarkable  Historical Information   Past Medical History:   Diagnosis Date    Anxiety     GERD (gastroesophageal reflux disease)      Past Surgical History:   Procedure Laterality Date    BACK SURGERY      COLONOSCOPY      FOOT SURGERY Right     hammer toe and bunions    OR COLONOSCOPY FLX DX W/COLLJ SPEC WHEN PFRMD N/A 7/25/2018    Procedure: EGD AND COLONOSCOPY;  Surgeon: Ayan Nick MD;  Location: [facility] GI LAB; Service: Gastroenterology     Social History   History   Alcohol Use No     History   Drug Use No     History   Smoking Status    Former Smoker   Smokeless Tobacco    Never Used     Comment: did not smoke much      Family History   Problem Relation Age of Onset    Lung cancer Mother        Meds/Allergies     Prescriptions Prior to Admission   Medication    FLUoxetine (PROzac) 20 MG tablet    promethazine (PHENERGAN) 12 5 MG tablet    pantoprazole (PROTONIX) 40 mg tablet       No Known Allergies    Objective     Blood pressure 114/64, pulse 58, temperature (!) 97 1 °F (36 2 °C), temperature source Oral, resp  rate 16, height 5' 7" (1 702 m), weight 74 8 kg (165 lb), SpO2 95 %  PHYSICAL EXAM    Gen: NAD  CV: RRR  CHEST: Clear  ABD: soft, NT/ND  EXT: no edema      ASSESSMENT/PLAN:  This is a 79y o  year old male here for endoscopic ultrasound with possible fine-needle aspiration, and he is stable and optimized for his procedure

## 2018-10-15 NOTE — OP NOTE
ENDOSCOPIC ULTRASOUND    SEDATION: Monitored anesthesia care, check anesthesia records    ASA Class: 2    INDICATIONS:  CT scan and MRI revealed a 13x18 mm ovoid structure within or adjacent to the tail the pancreas which was I so intense with the spleen and most consistent with an accessory spleen  CONSENT:  Informed consent was obtained for the procedure, including sedation after explaining the risks and benefits of the procedure  Risks including but not limited to bleeding, perforation, infection, and missed lesion  PREPARATION:   Telemetry, pulse oximetry, blood pressure were monitored throughout the procedure  Patient was identified by myself both verbally and by visual inspection of ID band  DESCRIPTION:   Patient was placed in the left lateral decubitus position and was sedated with the above medication  The gastroscope was introduced in to the oropharynx and the esophagus was intubated under direct visualization  Scope was passed down the esophagus up to 2nd part of the duodenum  A careful inspection was made as the gastroscope was withdrawn, including a retroflexed view of the stomach; findings and interventions are described below  FINDINGS:    EGD FINDINGS:  Limited endoscopic view with oblique viewing echoendoscope was unremarkable  EUS FINDINGS:  There was a 16 mm x 16 mm hypoechoic lesion next to the tail the pancreas which appeared to have a similar echo texture to the spleen  This did not appear to be in the pancreas itself and had a very smooth is round to oval shape  There were no discrete lesions in the head, body, or tail the pancreas  The tiny cyst seen on his MRI was not visualized during this exam   The common bile duct and pancreatic duct were not dilated  There were no stones in the gallbladder  There were no lesions seen in the examined part of the liver and there were no enlarged lymph nodes in the celiac or gastrohepatic areas           IMPRESSIONS:      16 mm x 16 mm hypoechoic lesion next to the tail the pancreas is 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

## 2018-10-15 NOTE — ANESTHESIA PREPROCEDURE EVALUATION
Review of Systems/Medical History  Patient summary reviewed  Chart reviewed  No history of anesthetic complications     Cardiovascular   Pulmonary       GI/Hepatic    GERD well controlled, Pancreatic problem,             Endo/Other     GYN       Hematology   Musculoskeletal       Neurology   Psychology   Anxiety,              Physical Exam    Airway    Mallampati score: II  TM Distance: >3 FB  Neck ROM: full     Dental   No notable dental hx     Cardiovascular      Pulmonary      Other Findings        Anesthesia Plan  ASA Score- 2     Anesthesia Type- IV sedation with anesthesia with ASA Monitors  Additional Monitors:   Airway Plan:         Plan Factors-    Induction- intravenous  Postoperative Plan-     Informed Consent- Anesthetic plan and risks discussed with patient  I personally reviewed this patient with the CRNA  Discussed and agreed on the Anesthesia Plan with the CRNA  Maria Isabel Jimenes

## 2018-11-16 DIAGNOSIS — R11.0 NAUSEA: ICD-10-CM

## 2018-11-16 RX ORDER — PROMETHAZINE HYDROCHLORIDE 25 MG/1
25 TABLET ORAL EVERY 8 HOURS PRN
Qty: 90 TABLET | Refills: 3 | Status: SHIPPED | OUTPATIENT
Start: 2018-11-16 | End: 2018-11-27 | Stop reason: SDUPTHER

## 2018-11-27 ENCOUNTER — OFFICE VISIT (OUTPATIENT)
Dept: GASTROENTEROLOGY | Facility: CLINIC | Age: 68
End: 2018-11-27
Payer: MEDICARE

## 2018-11-27 VITALS
HEART RATE: 66 BPM | DIASTOLIC BLOOD PRESSURE: 59 MMHG | TEMPERATURE: 96.8 F | SYSTOLIC BLOOD PRESSURE: 116 MMHG | HEIGHT: 67 IN | BODY MASS INDEX: 27.69 KG/M2 | WEIGHT: 176.4 LBS

## 2018-11-27 DIAGNOSIS — R63.4 WEIGHT LOSS: ICD-10-CM

## 2018-11-27 DIAGNOSIS — R10.12 LEFT UPPER QUADRANT PAIN: Primary | ICD-10-CM

## 2018-11-27 DIAGNOSIS — K86.89 PANCREATIC MASS: ICD-10-CM

## 2018-11-27 DIAGNOSIS — R11.0 NAUSEA: ICD-10-CM

## 2018-11-27 DIAGNOSIS — Z86.010 HISTORY OF COLON POLYPS: ICD-10-CM

## 2018-11-27 PROCEDURE — 99214 OFFICE O/P EST MOD 30 MIN: CPT | Performed by: INTERNAL MEDICINE

## 2018-11-27 RX ORDER — PROMETHAZINE HYDROCHLORIDE 25 MG/1
12.5 TABLET ORAL EVERY 8 HOURS PRN
Qty: 90 TABLET | Refills: 5 | Status: SHIPPED | OUTPATIENT
Start: 2018-11-27 | End: 2019-12-20

## 2018-11-27 NOTE — LETTER
November 27, 2018     DO Mike [facility] [place]  [address] [address]    Patient: Kajal Rinaldi   YOB: 1950   Date of Visit: 11/27/2018       Dear Dr Eran Montelongo: Thank you for referring Esau Marinelli to me for evaluation  Below are my notes for this consultation  If you have questions, please do not hesitate to call me  I look forward to following your patient along with you  Sincerely,        Obed Serrano MD        CC: No Recipients  Obed Serrano MD  11/27/2018  9:10 AM  Sign at close encounter  [facility] - Outpatient Follow-up Note  Kajal Rinaldi 79 y o  male MRN: [id number]  Encounter: [id number]          ASSESSMENT AND PLAN:      1  Left upper quadrant pain  2  Weight loss  3  Nausea  I will refill his promethazine and encouraged him to take it as needed for his symptoms  Since his workup to this point has been negative I suspect his nausea is due to a viral gastroenteritis in May or June of this year that was followed by this persistent symptom  This usually does improve after some months to a year  - promethazine (PHENERGAN) 25 mg tablet; Take 0 5 tablets (12 5 mg total) by mouth every 8 (eight) hours as needed for nausea or vomiting  Dispense: 90 tablet; Refill: 5    4  Pancreatic mass  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 and a small cyst in his pancreas  I will plan for repeat MRI in October 2019 to assess for any change in the size or appearance of these lesions  I suspect they are not related to his left upper quadrant pain and nausea symptoms  5  History of colon polyps:  Since he had polyps on his last colonoscopy, he is due for his next one in approximately five years      ______________________________________________________________________    SUBJECTIVE:  He presents for evaluation and follow-up because of his history of a lesion next to the tail of his pancreas, his history of colon polyps, and left upper quadrant pain and nausea  Fortunately his weight loss has now resolved and he has been gaining weight since his last visit  He feels his nausea is well controlled with the promethazine and he has not had any recent vomiting  He denies any abdominal pain, change in bowel habits, or bleeding  His most recent endoscopic ultrasound revealed a small cyst in the pancreas and a small nodule next to the tail of his pancreas which was most likely an accessory spleen  REVIEW OF SYSTEMS IS OTHERWISE NEGATIVE  Historical Information   Past Medical History:   Diagnosis Date    Anxiety     GERD (gastroesophageal reflux disease)      Past Surgical History:   Procedure Laterality Date    BACK SURGERY      COLONOSCOPY      FOOT SURGERY Right     hammer toe and bunions    WI COLONOSCOPY FLX DX W/COLLJ SPEC WHEN PFRMD N/A 7/25/2018    Procedure: EGD AND COLONOSCOPY;  Surgeon: Emilee Frazier MD;  Location: [facility] GI LAB; Service: Gastroenterology    WI EDG US EXAM SURGICAL ALTER STOM DUODENUM/JEJUNUM  10/15/2018    Procedure: RADIAL ENDOSCOPIC U/S;  Surgeon: Emilee Frazier MD;  Location:  GI LAB; Service: Gastroenterology     Social History   History   Alcohol Use No     History   Drug Use No     History   Smoking Status    Former Smoker   Smokeless Tobacco    Never Used     Comment: did not smoke much      Family History   Problem Relation Age of Onset    Lung cancer Mother        Meds/Allergies       Current Outpatient Prescriptions:     FLUoxetine (PROzac) 20 MG tablet    pantoprazole (PROTONIX) 40 mg tablet    promethazine (PHENERGAN) 25 mg tablet    No Known Allergies        Objective     Blood pressure 116/59, pulse 66, temperature (!) 96 8 °F (36 °C), temperature source Tympanic, height 5' 7" (1 702 m), weight 80 kg (176 lb 6 4 oz)  Body mass index is 27 63 kg/m²        PHYSICAL EXAM:      General Appearance:   Alert, cooperative, no distress   HEENT:   Normocephalic, atraumatic, anicteric      Neck:  Supple, symmetrical, trachea midline   Lungs:   Clear to auscultation bilaterally; no rales, rhonchi or wheezing; respirations unlabored    Heart[de-identified]   Regular rate and rhythm; no murmur, rub, or gallop  Abdomen:   Soft, non-tender, non-distended; normal bowel sounds; no masses, no organomegaly    Genitalia:   Deferred    Rectal:   Deferred    Extremities:  No cyanosis, clubbing or edema    Pulses:  2+ and symmetric    Skin:  No jaundice, rashes, or lesions    Lymph nodes:  No palpable cervical lymphadenopathy        Lab Results:   No visits with results within 1 Day(s) from this visit  Latest known visit with results is:   Appointment on 09/24/2018   Component Date Value    LYME AB IGG 09/24/2018 0 29     LYME AB IGM 09/24/2018 0 17          Radiology Results:   No results found      Answers for HPI/ROS submitted by the patient on 11/26/2018   Abdominal pain  Progression since onset: gradually improving  Pain location: epigastric region  Pain - numeric: 1/10  Pain quality: dull  Radiates to: does not radiate  anorexia: No  arthralgias: No  belching: No  constipation: No  diarrhea: No  dysuria: No  fever: No  flatus: No  frequency: No  headaches: No  hematochezia: No  hematuria: No  melena: No  myalgias: No  nausea: Yes  weight loss: Yes  vomiting: No  Aggravated by: nothing  Relieved by: nothing  Diagnostic workup: CT scan, lower endoscopy, upper endoscopy

## 2018-11-27 NOTE — PROGRESS NOTES
[facility] - Outpatient Follow-up Note  Ana María Harden 79 y o  male MRN: [id number]  Encounter: [id number]          ASSESSMENT AND PLAN:      1  Left upper quadrant pain  2  Weight loss  3  Nausea  I will refill his promethazine and encouraged him to take it as needed for his symptoms  Since his workup to this point has been negative I suspect his nausea is due to a viral gastroenteritis in May or June of this year that was followed by this persistent symptom  This usually does improve after some months to a year  - promethazine (PHENERGAN) 25 mg tablet; Take 0 5 tablets (12 5 mg total) by mouth every 8 (eight) hours as needed for nausea or vomiting  Dispense: 90 tablet; Refill: 5    4  Pancreatic mass  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 and a small cyst in his pancreas  I will plan for repeat MRI in October 2019 to assess for any change in the size or appearance of these lesions  I suspect they are not related to his left upper quadrant pain and nausea symptoms  5  History of colon polyps:  Since he had polyps on his last colonoscopy, he is due for his next one in approximately five years  ______________________________________________________________________    SUBJECTIVE:  He presents for evaluation and follow-up because of his history of a lesion next to the tail of his pancreas, his history of colon polyps, and left upper quadrant pain and nausea  Fortunately his weight loss has now resolved and he has been gaining weight since his last visit  He feels his nausea is well controlled with the promethazine and he has not had any recent vomiting  He denies any abdominal pain, change in bowel habits, or bleeding  His most recent endoscopic ultrasound revealed a small cyst in the pancreas and a small nodule next to the tail of his pancreas which was most likely an accessory spleen        REVIEW OF SYSTEMS IS OTHERWISE NEGATIVE  Historical Information   Past Medical History:   Diagnosis Date    Anxiety     GERD (gastroesophageal reflux disease)      Past Surgical History:   Procedure Laterality Date    BACK SURGERY      COLONOSCOPY      FOOT SURGERY Right     hammer toe and bunions    [place] COLONOSCOPY FLX DX W/COLLJ SPEC WHEN PFRMD N/A 7/25/2018    Procedure: EGD AND COLONOSCOPY;  Surgeon: Aman Little MD;  Location: [facility] GI LAB; Service: Gastroenterology    [place] EDG US EXAM SURGICAL ALTER STOM DUODENUM/JEJUNUM  10/15/2018    Procedure: RADIAL ENDOSCOPIC U/S;  Surgeon: Aman Little MD;  Location:  GI LAB; Service: Gastroenterology     Social History   History   Alcohol Use No     History   Drug Use No     History   Smoking Status    Former Smoker   Smokeless Tobacco    Never Used     Comment: did not smoke much      Family History   Problem Relation Age of Onset    Lung cancer Mother        Meds/Allergies       Current Outpatient Prescriptions:     FLUoxetine (PROzac) 20 MG tablet    pantoprazole (PROTONIX) 40 mg tablet    promethazine (PHENERGAN) 25 mg tablet    No Known Allergies        Objective     Blood pressure 116/59, pulse 66, temperature (!) 96 8 °F (36 °C), temperature source Tympanic, height 5' 7" (1 702 m), weight 80 kg (176 lb 6 4 oz)  Body mass index is 27 63 kg/m²  PHYSICAL EXAM:      General Appearance:   Alert, cooperative, no distress   HEENT:   Normocephalic, atraumatic, anicteric      Neck:  Supple, symmetrical, trachea midline   Lungs:   Clear to auscultation bilaterally; no rales, rhonchi or wheezing; respirations unlabored    Heart[de-identified]   Regular rate and rhythm; no murmur, rub, or gallop     Abdomen:   Soft, non-tender, non-distended; normal bowel sounds; no masses, no organomegaly    Genitalia:   Deferred    Rectal:   Deferred    Extremities:  No cyanosis, clubbing or edema    Pulses:  2+ and symmetric    Skin:  No jaundice, rashes, or lesions    Lymph nodes:  No palpable cervical lymphadenopathy        Lab Results:   No visits with results within 1 Day(s) from this visit  Latest known visit with results is:   Appointment on 09/24/2018   Component Date Value    LYME AB IGG 09/24/2018 0 29     LYME AB IGM 09/24/2018 0 17          Radiology Results:   No results found      Answers for HPI/ROS submitted by the patient on 11/26/2018   Abdominal pain  Progression since onset: gradually improving  Pain location: epigastric region  Pain - numeric: 1/10  Pain quality: dull  Radiates to: does not radiate  anorexia: No  arthralgias: No  belching: No  constipation: No  diarrhea: No  dysuria: No  fever: No  flatus: No  frequency: No  headaches: No  hematochezia: No  hematuria: No  melena: No  myalgias: No  nausea: Yes  weight loss: Yes  vomiting: No  Aggravated by: nothing  Relieved by: nothing  Diagnostic workup: CT scan, lower endoscopy, upper endoscopy

## 2019-03-20 ENCOUNTER — TELEPHONE (OUTPATIENT)
Dept: GASTROENTEROLOGY | Facility: CLINIC | Age: 69
End: 2019-03-20

## 2019-03-27 ENCOUNTER — TELEPHONE (OUTPATIENT)
Dept: GASTROENTEROLOGY | Facility: AMBULARY SURGERY CENTER | Age: 69
End: 2019-03-27

## 2019-03-27 NOTE — TELEPHONE ENCOUNTER
DR RAMIREZ'S PT    Pt called requesting to schedule his May follow up with DR Cee  only at the [address] [place] [address]

## 2019-06-10 ENCOUNTER — OFFICE VISIT (OUTPATIENT)
Dept: GASTROENTEROLOGY | Facility: CLINIC | Age: 69
End: 2019-06-10
Payer: MEDICARE

## 2019-06-10 VITALS
SYSTOLIC BLOOD PRESSURE: 125 MMHG | TEMPERATURE: 97.6 F | HEIGHT: 67 IN | WEIGHT: 181.6 LBS | HEART RATE: 63 BPM | DIASTOLIC BLOOD PRESSURE: 80 MMHG | BODY MASS INDEX: 28.5 KG/M2

## 2019-06-10 DIAGNOSIS — K86.2 PANCREATIC CYST: Primary | ICD-10-CM

## 2019-06-10 PROCEDURE — 99214 OFFICE O/P EST MOD 30 MIN: CPT | Performed by: PHYSICIAN ASSISTANT

## 2019-06-11 ENCOUNTER — APPOINTMENT (OUTPATIENT)
Dept: LAB | Age: 69
End: 2019-06-11
Payer: MEDICARE

## 2019-06-11 ENCOUNTER — TRANSCRIBE ORDERS (OUTPATIENT)
Dept: ADMINISTRATIVE | Age: 69
End: 2019-06-11

## 2019-06-11 DIAGNOSIS — E78.5 HYPERLIPIDEMIA, UNSPECIFIED HYPERLIPIDEMIA TYPE: Primary | ICD-10-CM

## 2019-06-11 DIAGNOSIS — R63.5 ABNORMAL WEIGHT GAIN: ICD-10-CM

## 2019-06-11 DIAGNOSIS — R53.83 FATIGUE, UNSPECIFIED TYPE: ICD-10-CM

## 2019-06-11 DIAGNOSIS — N13.8 ENLARGED PROSTATE WITH URINARY OBSTRUCTION: ICD-10-CM

## 2019-06-11 DIAGNOSIS — N40.1 ENLARGED PROSTATE WITH URINARY OBSTRUCTION: ICD-10-CM

## 2019-06-11 DIAGNOSIS — E78.5 HYPERLIPIDEMIA, UNSPECIFIED HYPERLIPIDEMIA TYPE: ICD-10-CM

## 2019-06-11 LAB
25(OH)D3 SERPL-MCNC: 28.8 NG/ML (ref 30–100)
ALBUMIN SERPL BCP-MCNC: 3.7 G/DL (ref 3.5–5)
ALP SERPL-CCNC: 90 U/L (ref 46–116)
ALT SERPL W P-5'-P-CCNC: 29 U/L (ref 12–78)
ANION GAP SERPL CALCULATED.3IONS-SCNC: 6 MMOL/L (ref 4–13)
AST SERPL W P-5'-P-CCNC: 22 U/L (ref 5–45)
BILIRUB SERPL-MCNC: 0.55 MG/DL (ref 0.2–1)
BUN SERPL-MCNC: 18 MG/DL (ref 5–25)
CALCIUM SERPL-MCNC: 8.7 MG/DL (ref 8.3–10.1)
CHLORIDE SERPL-SCNC: 108 MMOL/L (ref 100–108)
CHOLEST SERPL-MCNC: 236 MG/DL (ref 50–200)
CO2 SERPL-SCNC: 28 MMOL/L (ref 21–32)
CREAT SERPL-MCNC: 1.18 MG/DL (ref 0.6–1.3)
GFR SERPL CREATININE-BSD FRML MDRD: 64 ML/MIN/1.73SQ M
GLUCOSE P FAST SERPL-MCNC: 83 MG/DL (ref 65–99)
HDLC SERPL-MCNC: 48 MG/DL (ref 40–60)
LDLC SERPL CALC-MCNC: 159 MG/DL (ref 0–100)
NONHDLC SERPL-MCNC: 188 MG/DL
POTASSIUM SERPL-SCNC: 4.3 MMOL/L (ref 3.5–5.3)
PROT SERPL-MCNC: 7.2 G/DL (ref 6.4–8.2)
PSA SERPL-MCNC: 0.7 NG/ML (ref 0–4)
SODIUM SERPL-SCNC: 142 MMOL/L (ref 136–145)
TRIGL SERPL-MCNC: 145 MG/DL
TSH SERPL DL<=0.05 MIU/L-ACNC: 1.95 UIU/ML (ref 0.36–3.74)

## 2019-06-11 PROCEDURE — 36415 COLL VENOUS BLD VENIPUNCTURE: CPT

## 2019-06-11 PROCEDURE — 84153 ASSAY OF PSA TOTAL: CPT

## 2019-06-11 PROCEDURE — 80053 COMPREHEN METABOLIC PANEL: CPT

## 2019-06-11 PROCEDURE — 84443 ASSAY THYROID STIM HORMONE: CPT

## 2019-06-11 PROCEDURE — 80061 LIPID PANEL: CPT

## 2019-06-11 PROCEDURE — 82306 VITAMIN D 25 HYDROXY: CPT

## 2019-06-19 ENCOUNTER — APPOINTMENT (OUTPATIENT)
Dept: LAB | Age: 69
End: 2019-06-19
Payer: MEDICARE

## 2019-06-19 DIAGNOSIS — N13.8 ENLARGED PROSTATE WITH URINARY OBSTRUCTION: ICD-10-CM

## 2019-06-19 DIAGNOSIS — N40.1 ENLARGED PROSTATE WITH URINARY OBSTRUCTION: ICD-10-CM

## 2019-06-19 DIAGNOSIS — R53.83 FATIGUE, UNSPECIFIED TYPE: ICD-10-CM

## 2019-06-19 DIAGNOSIS — E78.5 HYPERLIPIDEMIA, UNSPECIFIED HYPERLIPIDEMIA TYPE: ICD-10-CM

## 2019-06-19 DIAGNOSIS — R63.5 ABNORMAL WEIGHT GAIN: ICD-10-CM

## 2019-06-19 LAB
BASOPHILS # BLD AUTO: 0.08 THOUSANDS/ΜL (ref 0–0.1)
BASOPHILS NFR BLD AUTO: 1 % (ref 0–1)
EOSINOPHIL # BLD AUTO: 0.31 THOUSAND/ΜL (ref 0–0.61)
EOSINOPHIL NFR BLD AUTO: 5 % (ref 0–6)
ERYTHROCYTE [DISTWIDTH] IN BLOOD BY AUTOMATED COUNT: 12.6 % (ref 11.6–15.1)
HCT VFR BLD AUTO: 44.2 % (ref 36.5–49.3)
HGB BLD-MCNC: 14.6 G/DL (ref 12–17)
IMM GRANULOCYTES # BLD AUTO: 0.02 THOUSAND/UL (ref 0–0.2)
IMM GRANULOCYTES NFR BLD AUTO: 0 % (ref 0–2)
LYMPHOCYTES # BLD AUTO: 0.98 THOUSANDS/ΜL (ref 0.6–4.47)
LYMPHOCYTES NFR BLD AUTO: 17 % (ref 14–44)
MCH RBC QN AUTO: 29.4 PG (ref 26.8–34.3)
MCHC RBC AUTO-ENTMCNC: 33 G/DL (ref 31.4–37.4)
MCV RBC AUTO: 89 FL (ref 82–98)
MONOCYTES # BLD AUTO: 0.57 THOUSAND/ΜL (ref 0.17–1.22)
MONOCYTES NFR BLD AUTO: 10 % (ref 4–12)
NEUTROPHILS # BLD AUTO: 3.9 THOUSANDS/ΜL (ref 1.85–7.62)
NEUTS SEG NFR BLD AUTO: 67 % (ref 43–75)
NRBC BLD AUTO-RTO: 0 /100 WBCS
PLATELET # BLD AUTO: 178 THOUSANDS/UL (ref 149–390)
PMV BLD AUTO: 10.3 FL (ref 8.9–12.7)
RBC # BLD AUTO: 4.96 MILLION/UL (ref 3.88–5.62)
WBC # BLD AUTO: 5.86 THOUSAND/UL (ref 4.31–10.16)

## 2019-06-19 PROCEDURE — 85025 COMPLETE CBC W/AUTO DIFF WBC: CPT

## 2019-06-19 PROCEDURE — 36415 COLL VENOUS BLD VENIPUNCTURE: CPT

## 2019-06-28 ENCOUNTER — TRANSCRIBE ORDERS (OUTPATIENT)
Dept: ADMINISTRATIVE | Facility: HOSPITAL | Age: 69
End: 2019-06-28

## 2019-06-28 DIAGNOSIS — R53.83 FATIGUE, UNSPECIFIED TYPE: Primary | ICD-10-CM

## 2019-07-05 ENCOUNTER — HOSPITAL ENCOUNTER (OUTPATIENT)
Dept: NON INVASIVE DIAGNOSTICS | Facility: CLINIC | Age: 69
Discharge: HOME | End: 2019-07-05
Payer: MEDICARE

## 2019-07-05 DIAGNOSIS — R53.83 FATIGUE, UNSPECIFIED TYPE: ICD-10-CM

## 2019-07-05 LAB
ARRHY DURING EX: NORMAL
CHEST PAIN STATEMENT: NORMAL
MAX DIASTOLIC BP: 92 MMHG
MAX HEART RATE: 133 BPM
MAX PREDICTED HEART RATE: 154 BPM
MAX. SYSTOLIC BP: 180 MMHG
PROTOCOL NAME: NORMAL
REASON FOR TERMINATION: NORMAL
TARGET HR FORMULA: NORMAL
TEST INDICATION: NORMAL
TIME IN EXERCISE PHASE: NORMAL

## 2019-07-05 PROCEDURE — 93017 CV STRESS TEST TRACING ONLY: CPT

## 2019-07-05 PROCEDURE — 93016 CV STRESS TEST SUPVJ ONLY: CPT | Performed by: INTERNAL MEDICINE

## 2019-07-05 PROCEDURE — 93018 CV STRESS TEST I&R ONLY: CPT | Performed by: INTERNAL MEDICINE

## 2019-07-10 ENCOUNTER — TELEPHONE (OUTPATIENT)
Dept: GASTROENTEROLOGY | Facility: CLINIC | Age: 69
End: 2019-07-10

## 2019-07-10 NOTE — TELEPHONE ENCOUNTER
Spoke to Sarina with [employer] (patient's [employer]), he states that no authorization is required for the MRI of the abdomen with and without contrast ([id number])     Reference #: [id number]

## 2019-08-01 ENCOUNTER — HOSPITAL ENCOUNTER (OUTPATIENT)
Dept: RADIOLOGY | Facility: HOSPITAL | Age: 69
Discharge: HOME | End: 2019-08-01
Payer: MEDICARE

## 2019-08-01 DIAGNOSIS — K86.2 PANCREATIC CYST: ICD-10-CM

## 2019-08-01 PROCEDURE — 76376 3D RENDER W/INTRP POSTPROCES: CPT

## 2019-08-01 PROCEDURE — 74183 MRI ABD W/O CNTR FLWD CNTR: CPT

## 2019-08-01 PROCEDURE — A9585 GADOBUTROL INJECTION: HCPCS | Performed by: PHYSICIAN ASSISTANT

## 2019-08-01 RX ADMIN — GADOBUTROL 8 ML: 604.72 INJECTION INTRAVENOUS at 12:14

## 2019-08-19 ENCOUNTER — TELEPHONE (OUTPATIENT)
Dept: GASTROENTEROLOGY | Facility: CLINIC | Age: 69
End: 2019-08-19

## 2019-08-20 NOTE — TELEPHONE ENCOUNTER
I spoke to Derick  I explained that his MRI appears stable with 3 mm cyst in the pancreatic body  He is recommended to have repeat MRI in 2 years  Our office will place a recall in [address] [place] [address]  He expressed understanding and all questions were answered

## 2019-11-25 NOTE — LETTER
44-year-old male referred by ERIC Rg on a routine consultation for pain after patient underwent umbilical hernia repair by Dr. Herbert 2 years ago.  For some reason patient does not wish to see Dr. Herbert again.  Patient has a pain at his umbilicus but does not have any bulge or any signs of recurrent hernia by his report.  He says he is had this pain since the surgery and its a dull type of pain in its intermittent when he moves certain way or he rubs his abdomen up against something or he lays prone.  Patient goes to physical therapy for his  neck and shoulder issues and has discomfort when he lays prone to do his physical therapy.  According to the chart patient had a fairly straightforward umbilical hernia with a small hernia just superior to his umbilicus that was repaired with ventral ex mesh in underlay technique.  Postoperatively the patient according to the chart he was doing well.      Reviewed the patient's CT scan which was done earlier this year that was ordered by ERIC Rg and agree with radiology that patient does not have an umbilical hernia and actually omentum likely is up next to the mesh and there is no bowel close to the mesh.  There is no evidence of any type of stricture or partial obstruction of the bowel.  No inflammatory changes.  I went over this with the patient and pointed these things out to him.    Examined him supine.  His abdomen is soft there is no palpable masses no obvious hernias clearly no peritoneal signs  No groin hernias  Skin is warm and dry and unremarkable    Assessment and plan  Felt I  Answered all of  the patient's questions.  We discussed hernia repairs and the use of mesh and the fact that some people have pain after hernia repairs for an extended period of time and  some people the pain is disabling.  The patient admits that his pain is not disabling.  We talked about possible surgical answer to this and in my opinion would include removing of the mesh  June 29, 2018     Referral [address] [place] [address]    Patient: Holly Llamas   YOB: 1950   Date of Visit: 6/29/2018       Dear Dr Teresita Regalado:    Thank you for referring Jeramy Zamarripa to me for evaluation  Below are my notes for this consultation  If you have questions, please do not hesitate to call me  I look forward to following your patient along with you  Sincerely,        Tete Kaplan MD        CC: Nury Warren MD  6/29/2018  8:51 AM  Sign at close encounter  [facility] - Outpatient Consultation  Holly Llamas 74 y o  male MRN: [id number]  Encounter: [id number]          ASSESSMENT AND PLAN:      1  Nausea  ***    2  Fatigue, unspecified type  ***    3  Loss of appetite  ***    4  Left upper quadrant pain  ***    ______________________________________________________________________    HPI:  ***      REVIEW OF SYSTEMS:    CONSTITUTIONAL: Denies any fever, chills, rigors, and weight loss  HEENT: No earache or tinnitus  Denies hearing loss or visual disturbances  CARDIOVASCULAR: No chest pain or palpitations  RESPIRATORY: Denies any cough, hemoptysis, shortness of breath or dyspnea on exertion  GASTROINTESTINAL: As noted in the History of Present Illness  GENITOURINARY: No problems with urination  Denies any hematuria or dysuria  NEUROLOGIC: No dizziness or vertigo, denies headaches  MUSCULOSKELETAL: Denies any muscle or joint pain  SKIN: Denies skin rashes, but complains of itching  ENDOCRINE: Denies excessive thirst  Denies intolerance to heat or cold  PSYCHOSOCIAL: Denies depression or anxiety  Denies any recent memory loss         Historical Information   Past Medical History:   Diagnosis Date    GERD (gastroesophageal reflux disease)      Past Surgical History:   Procedure Laterality Date    BACK SURGERY      COLONOSCOPY      FOOT SURGERY       Social History   History   Alcohol Use No History   Drug Use No     History   Smoking Status    Never Smoker   Smokeless Tobacco    Never Used     Family History   Problem Relation Age of Onset    Lung cancer Mother        Meds/Allergies     No current outpatient prescriptions on file  No Known Allergies        Objective     Blood pressure 120/70, pulse 64, temperature 97 9 °F (36 6 °C), temperature source Tympanic, height 5' 7" (1 702 m), weight 78 kg (172 lb)  Body mass index is 26 94 kg/m²  PHYSICAL EXAM:      General Appearance:   Alert, cooperative, no distress   HEENT:   Normocephalic, atraumatic, anicteric      Neck:  Supple, symmetrical, trachea midline   Lungs:   Clear to auscultation bilaterally; no rales, rhonchi or wheezing; respirations unlabored    Heart[de-identified]   Regular rate and rhythm; no murmur, rub, or gallop  Abdomen:   Soft, non-tender, non-distended; normal bowel sounds; no masses, no organomegaly    Genitalia:   Deferred    Rectal:   Deferred    Extremities:  No cyanosis, clubbing or edema    Pulses:  2+ and symmetric    Skin:  No jaundice, rashes, or lesions    Lymph nodes:  No palpable cervical lymphadenopathy        Lab Results:   No visits with results within 1 Day(s) from this visit  Latest known visit with results is:   Appointment on 06/08/2018   Component Date Value    CRP 06/08/2018 <3 0     Sed Rate 06/08/2018 13*    Toxoplasma Gondii IgG 06/08/2018 <3 0     Toxoplasma IgM 06/08/2018 <3 0     Comment 06/08/2018 Comment     IgE 06/08/2018 59 0          Radiology Results:   No results found  and most likely his umbilicus and then primary closure.  There would be no guarantee that that would take care of his pain and he would run the risk of getting a recurrent hernia.  With the symptoms that he is currently describing I would not recommend the surgical option.  The patient says that he was not interested in surgery he only wanted to find out what was causing his pain.  I went over that again with him he seemed to get upset at that point and just got up and left.

## 2019-12-04 ENCOUNTER — APPOINTMENT (OUTPATIENT)
Dept: RADIOLOGY | Age: 69
End: 2019-12-04
Payer: MEDICARE

## 2019-12-04 ENCOUNTER — TRANSCRIBE ORDERS (OUTPATIENT)
Dept: ADMINISTRATIVE | Age: 69
End: 2019-12-04

## 2019-12-04 DIAGNOSIS — M40.209 ACQUIRED HUNCHBACK: ICD-10-CM

## 2019-12-04 DIAGNOSIS — M40.209 ACQUIRED HUNCHBACK: Primary | ICD-10-CM

## 2019-12-04 PROCEDURE — 72072 X-RAY EXAM THORAC SPINE 3VWS: CPT

## 2020-03-18 ENCOUNTER — TELEPHONE (OUTPATIENT)
Dept: GASTROENTEROLOGY | Facility: CLINIC | Age: 70
End: 2020-03-18

## 2020-03-18 NOTE — TELEPHONE ENCOUNTER
Called patient to schedule his yearly follow-up in June/July as per recall  Patient stated when he saw [address] [place] [address] last year, she stated he doesn't need a follow-up, just an MRI in 2 years  Could you verify if patient should be seen in the office or if he should have a yearly MRI done  I don't see any orders for an MRI  Please advise

## 2020-03-25 NOTE — TELEPHONE ENCOUNTER
His next MRI can be in 8/2021  He could see us in the office a few months before this so we can review his symptoms and re-examine him  However, if he is currently having symptoms and wants to see us sooner, please schedule him for a video visit with me   Thanks

## 2020-06-04 ENCOUNTER — TRANSCRIBE ORDERS (OUTPATIENT)
Dept: ADMINISTRATIVE | Age: 70
End: 2020-06-04

## 2020-06-04 ENCOUNTER — APPOINTMENT (OUTPATIENT)
Dept: LAB | Age: 70
End: 2020-06-04
Payer: MEDICARE

## 2020-06-04 DIAGNOSIS — N40.1 ENLARGED PROSTATE WITH URINARY OBSTRUCTION: ICD-10-CM

## 2020-06-04 DIAGNOSIS — N13.8 ENLARGED PROSTATE WITH URINARY OBSTRUCTION: ICD-10-CM

## 2020-06-04 DIAGNOSIS — E55.9 AVITAMINOSIS D: ICD-10-CM

## 2020-06-04 DIAGNOSIS — E78.5 HYPERLIPIDEMIA, UNSPECIFIED HYPERLIPIDEMIA TYPE: ICD-10-CM

## 2020-06-04 DIAGNOSIS — E55.9 AVITAMINOSIS D: Primary | ICD-10-CM

## 2020-06-04 LAB
25(OH)D3 SERPL-MCNC: 35.6 NG/ML (ref 30–100)
ALBUMIN SERPL BCP-MCNC: 3.9 G/DL (ref 3.5–5)
ALP SERPL-CCNC: 75 U/L (ref 46–116)
ALT SERPL W P-5'-P-CCNC: 24 U/L (ref 12–78)
ANION GAP SERPL CALCULATED.3IONS-SCNC: 4 MMOL/L (ref 4–13)
AST SERPL W P-5'-P-CCNC: 17 U/L (ref 5–45)
BILIRUB SERPL-MCNC: 0.69 MG/DL (ref 0.2–1)
BUN SERPL-MCNC: 21 MG/DL (ref 5–25)
CALCIUM SERPL-MCNC: 9.4 MG/DL (ref 8.3–10.1)
CHLORIDE SERPL-SCNC: 109 MMOL/L (ref 100–108)
CHOLEST SERPL-MCNC: 230 MG/DL (ref 50–200)
CO2 SERPL-SCNC: 26 MMOL/L (ref 21–32)
CREAT SERPL-MCNC: 1.1 MG/DL (ref 0.6–1.3)
ERYTHROCYTE [DISTWIDTH] IN BLOOD BY AUTOMATED COUNT: 14.1 % (ref 11.6–15.1)
GFR SERPL CREATININE-BSD FRML MDRD: 69 ML/MIN/1.73SQ M
GLUCOSE P FAST SERPL-MCNC: 89 MG/DL (ref 65–99)
HCT VFR BLD AUTO: 43.5 % (ref 36.5–49.3)
HDLC SERPL-MCNC: 45 MG/DL
HGB BLD-MCNC: 13.9 G/DL (ref 12–17)
LDLC SERPL CALC-MCNC: 163 MG/DL (ref 0–100)
MCH RBC QN AUTO: 28.1 PG (ref 26.8–34.3)
MCHC RBC AUTO-ENTMCNC: 32 G/DL (ref 31.4–37.4)
MCV RBC AUTO: 88 FL (ref 82–98)
NONHDLC SERPL-MCNC: 185 MG/DL
PLATELET # BLD AUTO: 213 THOUSANDS/UL (ref 149–390)
PMV BLD AUTO: 10.2 FL (ref 8.9–12.7)
POTASSIUM SERPL-SCNC: 4.6 MMOL/L (ref 3.5–5.3)
PROT SERPL-MCNC: 7.4 G/DL (ref 6.4–8.2)
PSA SERPL-MCNC: 0.3 NG/ML (ref 0–4)
RBC # BLD AUTO: 4.94 MILLION/UL (ref 3.88–5.62)
SODIUM SERPL-SCNC: 139 MMOL/L (ref 136–145)
TRIGL SERPL-MCNC: 111 MG/DL
WBC # BLD AUTO: 4.87 THOUSAND/UL (ref 4.31–10.16)

## 2020-06-04 PROCEDURE — 80061 LIPID PANEL: CPT

## 2020-06-04 PROCEDURE — G0103 PSA SCREENING: HCPCS | Performed by: FAMILY MEDICINE

## 2020-06-04 PROCEDURE — 80053 COMPREHEN METABOLIC PANEL: CPT

## 2020-06-04 PROCEDURE — 85027 COMPLETE CBC AUTOMATED: CPT

## 2020-06-04 PROCEDURE — 82306 VITAMIN D 25 HYDROXY: CPT

## 2020-06-04 PROCEDURE — 36415 COLL VENOUS BLD VENIPUNCTURE: CPT | Performed by: FAMILY MEDICINE

## 2021-03-10 DIAGNOSIS — Z23 ENCOUNTER FOR IMMUNIZATION: ICD-10-CM

## 2021-03-18 ENCOUNTER — TRANSCRIBE ORDERS (OUTPATIENT)
Dept: ADMINISTRATIVE | Age: 71
End: 2021-03-18

## 2021-03-18 ENCOUNTER — APPOINTMENT (OUTPATIENT)
Dept: LAB | Age: 71
End: 2021-03-18
Payer: MEDICARE

## 2021-03-18 DIAGNOSIS — R10.30 INGUINAL PAIN, UNSPECIFIED LATERALITY: Primary | ICD-10-CM

## 2021-03-18 LAB
ALBUMIN SERPL BCP-MCNC: 3.5 G/DL (ref 3.5–5)
ALP SERPL-CCNC: 81 U/L (ref 46–116)
ALT SERPL W P-5'-P-CCNC: 18 U/L (ref 12–78)
ANION GAP SERPL CALCULATED.3IONS-SCNC: 3 MMOL/L (ref 4–13)
AST SERPL W P-5'-P-CCNC: 15 U/L (ref 5–45)
BASOPHILS # BLD AUTO: 0.07 THOUSANDS/ΜL (ref 0–0.1)
BASOPHILS NFR BLD AUTO: 2 % (ref 0–1)
BILIRUB SERPL-MCNC: 0.53 MG/DL (ref 0.2–1)
BILIRUB UR QL STRIP: NEGATIVE
BUN SERPL-MCNC: 20 MG/DL (ref 5–25)
CALCIUM SERPL-MCNC: 8.7 MG/DL (ref 8.3–10.1)
CHLORIDE SERPL-SCNC: 109 MMOL/L (ref 100–108)
CLARITY UR: CLEAR
CO2 SERPL-SCNC: 27 MMOL/L (ref 21–32)
COLOR UR: ABNORMAL
CREAT SERPL-MCNC: 1.09 MG/DL (ref 0.6–1.3)
EOSINOPHIL # BLD AUTO: 0.27 THOUSAND/ΜL (ref 0–0.61)
EOSINOPHIL NFR BLD AUTO: 7 % (ref 0–6)
ERYTHROCYTE [DISTWIDTH] IN BLOOD BY AUTOMATED COUNT: 13.8 % (ref 11.6–15.1)
GFR SERPL CREATININE-BSD FRML MDRD: 69 ML/MIN/1.73SQ M
GLUCOSE P FAST SERPL-MCNC: 98 MG/DL (ref 65–99)
GLUCOSE UR STRIP-MCNC: NEGATIVE MG/DL
HCT VFR BLD AUTO: 39 % (ref 36.5–49.3)
HGB BLD-MCNC: 12.3 G/DL (ref 12–17)
HGB UR QL STRIP.AUTO: NEGATIVE
IMM GRANULOCYTES # BLD AUTO: 0.01 THOUSAND/UL (ref 0–0.2)
IMM GRANULOCYTES NFR BLD AUTO: 0 % (ref 0–2)
KETONES UR STRIP-MCNC: NEGATIVE MG/DL
LEUKOCYTE ESTERASE UR QL STRIP: NEGATIVE
LYMPHOCYTES # BLD AUTO: 0.94 THOUSANDS/ΜL (ref 0.6–4.47)
LYMPHOCYTES NFR BLD AUTO: 25 % (ref 14–44)
MCH RBC QN AUTO: 26.3 PG (ref 26.8–34.3)
MCHC RBC AUTO-ENTMCNC: 31.5 G/DL (ref 31.4–37.4)
MCV RBC AUTO: 83 FL (ref 82–98)
MONOCYTES # BLD AUTO: 0.4 THOUSAND/ΜL (ref 0.17–1.22)
MONOCYTES NFR BLD AUTO: 10 % (ref 4–12)
NEUTROPHILS # BLD AUTO: 2.15 THOUSANDS/ΜL (ref 1.85–7.62)
NEUTS SEG NFR BLD AUTO: 56 % (ref 43–75)
NITRITE UR QL STRIP: NEGATIVE
NRBC BLD AUTO-RTO: 0 /100 WBCS
PH UR STRIP.AUTO: 6 [PH]
PLATELET # BLD AUTO: 209 THOUSANDS/UL (ref 149–390)
PMV BLD AUTO: 10.7 FL (ref 8.9–12.7)
POTASSIUM SERPL-SCNC: 4.2 MMOL/L (ref 3.5–5.3)
PROT SERPL-MCNC: 7 G/DL (ref 6.4–8.2)
PROT UR STRIP-MCNC: NEGATIVE MG/DL
RBC # BLD AUTO: 4.68 MILLION/UL (ref 3.88–5.62)
SODIUM SERPL-SCNC: 139 MMOL/L (ref 136–145)
SP GR UR STRIP.AUTO: 1.03 (ref 1–1.03)
UROBILINOGEN UR QL STRIP.AUTO: 0.2 E.U./DL
WBC # BLD AUTO: 3.84 THOUSAND/UL (ref 4.31–10.16)

## 2021-03-18 PROCEDURE — 85025 COMPLETE CBC W/AUTO DIFF WBC: CPT

## 2021-03-18 PROCEDURE — 81003 URINALYSIS AUTO W/O SCOPE: CPT

## 2021-03-18 PROCEDURE — 80053 COMPREHEN METABOLIC PANEL: CPT

## 2021-03-18 PROCEDURE — 36415 COLL VENOUS BLD VENIPUNCTURE: CPT

## 2021-04-08 ENCOUNTER — OFFICE VISIT (OUTPATIENT)
Dept: GASTROENTEROLOGY | Facility: CLINIC | Age: 71
End: 2021-04-08
Payer: MEDICARE

## 2021-04-08 VITALS
BODY MASS INDEX: 29.54 KG/M2 | TEMPERATURE: 96.7 F | HEIGHT: 67 IN | SYSTOLIC BLOOD PRESSURE: 110 MMHG | HEART RATE: 57 BPM | DIASTOLIC BLOOD PRESSURE: 80 MMHG | WEIGHT: 188.2 LBS

## 2021-04-08 DIAGNOSIS — R10.32 LEFT LOWER QUADRANT ABDOMINAL PAIN: ICD-10-CM

## 2021-04-08 DIAGNOSIS — Q89.09 ACCESSORY SPLEEN: ICD-10-CM

## 2021-04-08 DIAGNOSIS — K86.2 PANCREATIC CYST: Primary | ICD-10-CM

## 2021-04-08 DIAGNOSIS — K63.5 POLYP OF COLON, UNSPECIFIED PART OF COLON, UNSPECIFIED TYPE: ICD-10-CM

## 2021-04-08 PROBLEM — R63.4 WEIGHT LOSS: Status: RESOLVED | Noted: 2018-06-29 | Resolved: 2021-04-08

## 2021-04-08 PROBLEM — K86.89 PANCREATIC MASS: Status: RESOLVED | Noted: 2018-08-14 | Resolved: 2021-04-08

## 2021-04-08 PROCEDURE — 99214 OFFICE O/P EST MOD 30 MIN: CPT | Performed by: INTERNAL MEDICINE

## 2021-04-08 RX ORDER — DIPHENOXYLATE HYDROCHLORIDE AND ATROPINE SULFATE 2.5; .025 MG/1; MG/1
TABLET ORAL
COMMUNITY

## 2021-04-08 RX ORDER — LANSOPRAZOLE 30 MG/1
CAPSULE, DELAYED RELEASE ORAL
COMMUNITY
Start: 2021-03-17

## 2021-04-08 NOTE — PROGRESS NOTES
[facility] - Outpatient Follow-up Note  April Gilbert 78 y o  male MRN: [id number]  Encounter: [id number]          ASSESSMENT AND PLAN:      1  Pancreatic cyst  2  Accessory spleen    He has a history of a tiny pancreatic cyst and his last MRI with [facility] was in 2019 and he was asked repeat the procedure in two years  Since his recent CT scan with contrast was unremarkable and did not show any evidence of a pancreatic cyst, I feel we can wait on his MRI with [facility]CP for an additional year and will plan to perform that and 2022  His accessory spleen does not require any additional follow-up  3  Left lower quadrant abdominal pain    His left-sided abdominal pain was of unclear etiology but fortunately it has resolved  It may have been functional and I encouraged him to try a low FODMAP diet to see if this helps prevent recurrences  I asked him to let me know if his symptoms recur  4  Polyp of colon, unspecified part of colon, unspecified type    He is due for his next surveillance colonoscopy in approximately two years which would be five years after his last one  If his pain recurs we could consider a repeat colonoscopy sooner  ______________________________________________________________________    SUBJECTIVE:    He presents for follow-up of his pancreatic cyst and accessory spleen and colon polyp  Approximately one month ago he developed significant left-sided abdominal pain and had a CT scan performed which was unremarkable  He felt the pain has resolved over the past month and he now feels back to baseline  He denies any diarrhea, constipation, bleeding, or weight loss  Also denied reflux, dysphagia, and vomiting  His last colonoscopy was in 2018 and he was asked repeat the procedure in five years  REVIEW OF SYSTEMS IS OTHERWISE NEGATIVE        Historical Information   Past Medical History:   Diagnosis Date    Anxiety     GERD (gastroesophageal reflux disease)  HL (hearing loss) Over time    Tinnitus Years ago     Past Surgical History:   Procedure Laterality Date    BACK SURGERY      COLONOSCOPY      FOOT SURGERY Right     hammer toe and bunions    [place] COLONOSCOPY FLX DX W/COLLJ SPEC WHEN PFRMD N/A 7/25/2018    Procedure: EGD AND COLONOSCOPY;  Surgeon: Kulwinder Jenkins MD;  Location: [facility] GI LAB; Service: Gastroenterology    [place] EDG US EXAM SURGICAL ALTER STOM DUODENUM/JEJUNUM  10/15/2018    Procedure: RADIAL ENDOSCOPIC U/S;  Surgeon: Kulwinder Jenkins MD;  Location:  GI LAB; Service: Gastroenterology    UPPER GASTROINTESTINAL ENDOSCOPY       Social History   Social History     Substance and Sexual Activity   Alcohol Use No     Social History     Substance and Sexual Activity   Drug Use No     Social History     Tobacco Use   Smoking Status Former Smoker    Packs/day: 0 25    Years: 30 00    Pack years: 7 50    Types: Cigarettes   Smokeless Tobacco Never Used   Tobacco Comment    did not smoke much      Family History   Problem Relation Age of Onset    Lung cancer Mother     Cancer Mother        Meds/Allergies       Current Outpatient Medications:     lansoprazole (PREVACID) 30 mg capsule    multivitamin (THERAGRAN) TABS    Allergies   Allergen Reactions    Esomeprazole Other (See Comments)           Objective     Blood pressure 110/80, pulse 57, temperature (!) 96 7 °F (35 9 °C), temperature source Tympanic, height 5' 7" (1 702 m), weight 85 4 kg (188 lb 3 2 oz)  Body mass index is 29 48 kg/m²  PHYSICAL EXAM:      General Appearance:   Alert, cooperative, no distress   HEENT:   Normocephalic, atraumatic, anicteric      Neck:  Supple, symmetrical, trachea midline   Lungs:   Clear to auscultation bilaterally; no rales, rhonchi or wheezing; respirations unlabored    Heart[de-identified]   Regular rate and rhythm; no murmur, rub, or gallop     Abdomen:   Soft, non-tender, non-distended; normal bowel sounds; no masses, no organomegaly    Genitalia:   Deferred  Rectal:   Deferred    Extremities:  No cyanosis, clubbing or edema    Pulses:  2+ and symmetric    Skin:  No jaundice, rashes, or lesions    Lymph nodes:  No palpable cervical lymphadenopathy        Lab Results:   No visits with results within 1 Day(s) from this visit     Latest known visit with results is:   Appointment on 03/18/2021   Component Date Value    WBC 03/18/2021 3 84*    RBC 03/18/2021 4 68     Hemoglobin 03/18/2021 12 3     Hematocrit 03/18/2021 39 0     MCV 03/18/2021 83     MCH 03/18/2021 26 3*    MCHC 03/18/2021 31 5     RDW 03/18/2021 13 8     MPV 03/18/2021 10 7     Platelets 11/43/9104 209     nRBC 03/18/2021 0     Neutrophils Relative 03/18/2021 56     Immat GRANS % 03/18/2021 0     Lymphocytes Relative 03/18/2021 25     Monocytes Relative 03/18/2021 10     Eosinophils Relative 03/18/2021 7*    Basophils Relative 03/18/2021 2*    Neutrophils Absolute 03/18/2021 2 15     Immature Grans Absolute 03/18/2021 0 01     Lymphocytes Absolute 03/18/2021 0 94     Monocytes Absolute 03/18/2021 0 40     Eosinophils Absolute 03/18/2021 0 27     Basophils Absolute 03/18/2021 0 07     Sodium 03/18/2021 139     Potassium 03/18/2021 4 2     Chloride 03/18/2021 109*    CO2 03/18/2021 27     ANION GAP 03/18/2021 3*    BUN 03/18/2021 20     Creatinine 03/18/2021 1 09     Glucose, Fasting 03/18/2021 98     Calcium 03/18/2021 8 7     AST 03/18/2021 15     ALT 03/18/2021 18     Alkaline Phosphatase 03/18/2021 81     Total Protein 03/18/2021 7 0     Albumin 03/18/2021 3 5     Total Bilirubin 03/18/2021 0 53     eGFR 03/18/2021 69     Color, UA 03/18/2021 Dk Yellow     Clarity, UA 03/18/2021 Clear     Specific Gravity, UA 03/18/2021 1 031*    pH, UA 03/18/2021 6 0     Leukocytes, UA 03/18/2021 Negative     Nitrite, UA 03/18/2021 Negative     Protein, UA 03/18/2021 Negative     Glucose, UA 03/18/2021 Negative     Ketones, UA 03/18/2021 Negative     Urobilinogen, UA 03/18/2021 0 2     Bilirubin, UA 03/18/2021 Negative     Blood, UA 03/18/2021 Negative          Radiology Results:   No results found    Answers for HPI/ROS submitted by the patient on 4/2/2021   Abdominal pain  Chronicity: recurrent  Onset: 1 to 4 weeks ago  Onset quality: gradual  Frequency: daily  Episode duration: 10 hours  Progression since onset: gradually improving  Pain location: suprapubic region, left flank  Pain - numeric: 3/10  Pain quality: aching, cramping, dull, a sensation of fullness  Radiates to: does not radiate  anorexia: No  arthralgias: No  belching: Yes  constipation: No  diarrhea: No  dysuria: No  fever: No  flatus: No  frequency: No  headaches: No  hematochezia: No  hematuria: No  melena: No  myalgias: No  nausea: Yes  weight loss: No  vomiting: No  Aggravated by: nothing  Relieved by: nothing  Diagnostic workup: CT scan

## 2021-05-26 ENCOUNTER — APPOINTMENT (OUTPATIENT)
Dept: LAB | Age: 71
End: 2021-05-26
Payer: MEDICARE

## 2021-05-26 ENCOUNTER — TRANSCRIBE ORDERS (OUTPATIENT)
Dept: ADMINISTRATIVE | Age: 71
End: 2021-05-26

## 2021-05-26 DIAGNOSIS — R10.30 INGUINAL PAIN, UNSPECIFIED LATERALITY: ICD-10-CM

## 2021-05-26 DIAGNOSIS — R10.30 INGUINAL PAIN, UNSPECIFIED LATERALITY: Primary | ICD-10-CM

## 2021-05-26 LAB
BACTERIA UR QL AUTO: NORMAL /HPF
BASOPHILS # BLD AUTO: 0.07 THOUSANDS/ΜL (ref 0–0.1)
BASOPHILS NFR BLD AUTO: 2 % (ref 0–1)
BILIRUB UR QL STRIP: NEGATIVE
CLARITY UR: CLEAR
COLOR UR: YELLOW
EOSINOPHIL # BLD AUTO: 0.27 THOUSAND/ΜL (ref 0–0.61)
EOSINOPHIL NFR BLD AUTO: 6 % (ref 0–6)
ERYTHROCYTE [DISTWIDTH] IN BLOOD BY AUTOMATED COUNT: 15 % (ref 11.6–15.1)
GLUCOSE UR STRIP-MCNC: NEGATIVE MG/DL
HCT VFR BLD AUTO: 41.3 % (ref 36.5–49.3)
HGB BLD-MCNC: 13.2 G/DL (ref 12–17)
HGB UR QL STRIP.AUTO: NEGATIVE
HYALINE CASTS #/AREA URNS LPF: NORMAL /LPF
IMM GRANULOCYTES # BLD AUTO: 0.02 THOUSAND/UL (ref 0–0.2)
IMM GRANULOCYTES NFR BLD AUTO: 0 % (ref 0–2)
KETONES UR STRIP-MCNC: NEGATIVE MG/DL
LEUKOCYTE ESTERASE UR QL STRIP: NEGATIVE
LYMPHOCYTES # BLD AUTO: 1.15 THOUSANDS/ΜL (ref 0.6–4.47)
LYMPHOCYTES NFR BLD AUTO: 25 % (ref 14–44)
MCH RBC QN AUTO: 26.8 PG (ref 26.8–34.3)
MCHC RBC AUTO-ENTMCNC: 32 G/DL (ref 31.4–37.4)
MCV RBC AUTO: 84 FL (ref 82–98)
MONOCYTES # BLD AUTO: 0.45 THOUSAND/ΜL (ref 0.17–1.22)
MONOCYTES NFR BLD AUTO: 10 % (ref 4–12)
NEUTROPHILS # BLD AUTO: 2.69 THOUSANDS/ΜL (ref 1.85–7.62)
NEUTS SEG NFR BLD AUTO: 57 % (ref 43–75)
NITRITE UR QL STRIP: NEGATIVE
NON-SQ EPI CELLS URNS QL MICRO: NORMAL /HPF
NRBC BLD AUTO-RTO: 0 /100 WBCS
PH UR STRIP.AUTO: 6 [PH]
PLATELET # BLD AUTO: 202 THOUSANDS/UL (ref 149–390)
PMV BLD AUTO: 10.8 FL (ref 8.9–12.7)
PROT UR STRIP-MCNC: NEGATIVE MG/DL
RBC # BLD AUTO: 4.92 MILLION/UL (ref 3.88–5.62)
RBC #/AREA URNS AUTO: NORMAL /HPF
SP GR UR STRIP.AUTO: 1.02 (ref 1–1.03)
UROBILINOGEN UR QL STRIP.AUTO: 0.2 E.U./DL
WBC # BLD AUTO: 4.65 THOUSAND/UL (ref 4.31–10.16)
WBC #/AREA URNS AUTO: NORMAL /HPF

## 2021-05-26 PROCEDURE — 81001 URINALYSIS AUTO W/SCOPE: CPT | Performed by: NURSE PRACTITIONER

## 2021-05-26 PROCEDURE — 36415 COLL VENOUS BLD VENIPUNCTURE: CPT

## 2021-05-26 PROCEDURE — 85025 COMPLETE CBC W/AUTO DIFF WBC: CPT

## 2021-06-14 ENCOUNTER — APPOINTMENT (OUTPATIENT)
Dept: LAB | Age: 71
End: 2021-06-14
Payer: MEDICARE

## 2021-06-14 ENCOUNTER — TRANSCRIBE ORDERS (OUTPATIENT)
Dept: ADMINISTRATIVE | Age: 71
End: 2021-06-14

## 2021-06-14 ENCOUNTER — APPOINTMENT (OUTPATIENT)
Dept: RADIOLOGY | Age: 71
End: 2021-06-14
Payer: MEDICARE

## 2021-06-14 DIAGNOSIS — E55.9 AVITAMINOSIS D: ICD-10-CM

## 2021-06-14 DIAGNOSIS — E78.2 MIXED HYPERLIPIDEMIA: ICD-10-CM

## 2021-06-14 DIAGNOSIS — K21.00 GASTROESOPHAGEAL REFLUX DISEASE WITH ESOPHAGITIS, UNSPECIFIED WHETHER HEMORRHAGE: ICD-10-CM

## 2021-06-14 DIAGNOSIS — N13.8 ENLARGED PROSTATE WITH URINARY OBSTRUCTION: ICD-10-CM

## 2021-06-14 DIAGNOSIS — M79.643 PAIN OF HAND, UNSPECIFIED LATERALITY: ICD-10-CM

## 2021-06-14 DIAGNOSIS — N40.1 ENLARGED PROSTATE WITH URINARY OBSTRUCTION: ICD-10-CM

## 2021-06-14 DIAGNOSIS — M79.643 PAIN OF HAND, UNSPECIFIED LATERALITY: Primary | ICD-10-CM

## 2021-06-14 LAB
25(OH)D3 SERPL-MCNC: 29.9 NG/ML (ref 30–100)
CHOLEST SERPL-MCNC: 221 MG/DL (ref 50–200)
HDLC SERPL-MCNC: 47 MG/DL
LDLC SERPL CALC-MCNC: 147 MG/DL (ref 0–100)
NONHDLC SERPL-MCNC: 174 MG/DL
PSA SERPL-MCNC: 0.3 NG/ML (ref 0–4)
TRIGL SERPL-MCNC: 133 MG/DL

## 2021-06-14 PROCEDURE — G0103 PSA SCREENING: HCPCS | Performed by: FAMILY MEDICINE

## 2021-06-14 PROCEDURE — 82306 VITAMIN D 25 HYDROXY: CPT

## 2021-06-14 PROCEDURE — 72050 X-RAY EXAM NECK SPINE 4/5VWS: CPT

## 2021-06-14 PROCEDURE — 80061 LIPID PANEL: CPT

## 2021-06-14 PROCEDURE — 36415 COLL VENOUS BLD VENIPUNCTURE: CPT | Performed by: FAMILY MEDICINE

## 2021-06-17 ENCOUNTER — OFFICE VISIT (OUTPATIENT)
Dept: URGENT CARE | Age: 71
End: 2021-06-17
Payer: MEDICARE

## 2021-06-17 VITALS — RESPIRATION RATE: 18 BRPM | HEART RATE: 86 BPM | TEMPERATURE: 99.9 F | OXYGEN SATURATION: 98 %

## 2021-06-17 DIAGNOSIS — B34.9 VIRAL ILLNESS: ICD-10-CM

## 2021-06-17 DIAGNOSIS — Z20.822 ENCOUNTER FOR SCREENING LABORATORY TESTING FOR COVID-19 VIRUS: Primary | ICD-10-CM

## 2021-06-17 PROCEDURE — G0463 HOSPITAL OUTPT CLINIC VISIT: HCPCS | Performed by: NURSE PRACTITIONER

## 2021-06-17 PROCEDURE — U0003 INFECTIOUS AGENT DETECTION BY NUCLEIC ACID (DNA OR RNA); SEVERE ACUTE RESPIRATORY SYNDROME CORONAVIRUS 2 (SARS-COV-2) (CORONAVIRUS DISEASE [COVID-19]), AMPLIFIED PROBE TECHNIQUE, MAKING USE OF HIGH THROUGHPUT TECHNOLOGIES AS DESCRIBED BY CMS-2020-01-R: HCPCS | Performed by: NURSE PRACTITIONER

## 2021-06-17 PROCEDURE — U0005 INFEC AGEN DETEC AMPLI PROBE: HCPCS | Performed by: NURSE PRACTITIONER

## 2021-06-17 PROCEDURE — 99213 OFFICE O/P EST LOW 20 MIN: CPT | Performed by: NURSE PRACTITIONER

## 2021-06-17 NOTE — PATIENT INSTRUCTIONS
You should self isolate at home until you receive the results  If positive, you should remain on self-quarantine until 10 days after the time of the initial symptoms onset OR 72 hours after resolution of fever (without antipyretics) and symptoms  Tylenol as needed for pain   Increase fluid intake   Follow up with your PCP   Proceed to the ER for worsening symptoms     You can view your results on the [employer] aurelia  If positive, you need to follow up with your PCP for clearance to return to work  Viral Syndrome   WHAT YOU NEED TO KNOW:   Viral syndrome is a term used for symptoms of an infection caused by a virus  Viruses are spread easily from person to person through the air and on shared items  DISCHARGE INSTRUCTIONS:   Call your local emergency number (911 in the [address] [facility]) or have someone else call if:   · You have a seizure  · You cannot be woken  · You have chest pain or trouble breathing  Return to the emergency department if:   · You have a stiff neck, a bad headache, and sensitivity to light  · You feel weak, dizzy, or confused  · You stop urinating or urinate a lot less than usual     · You cough up blood or thick yellow or green mucus  · You have severe abdominal pain or your abdomen is larger than usual     Call your doctor if:   · Your symptoms do not get better with treatment or get worse after 3 days  · You have a rash or ear pain  · You have burning when you urinate  · You have questions or concerns about your condition or care  Medicines: You may  need any of the following:  · Acetaminophen  decreases pain and fever  It is available without a doctor's order  Ask how much to take and how often to take it  Follow directions  Read the labels of all other medicines you are using to see if they also contain acetaminophen, or ask your doctor or pharmacist  Acetaminophen can cause liver damage if not taken correctly   Do not use more than 4 grams (4,000 milligrams) total of acetaminophen in one day  · NSAIDs , such as ibuprofen, help decrease swelling, pain, and fever  NSAIDs can cause stomach bleeding or kidney problems in certain people  If you take blood thinner medicine, always ask your healthcare provider if NSAIDs are safe for you  Always read the medicine label and follow directions  · Cold medicine  helps decrease swelling, control a cough, and relieve chest or nasal congestion  · Saline nasal spray  helps decrease nasal congestion  · Take your medicine as directed  Contact your healthcare provider if you think your medicine is not helping or if you have side effects  Tell him of her if you are allergic to any medicine  Keep a list of the medicines, vitamins, and herbs you take  Include the amounts, and when and why you take them  Bring the list or the pill bottles to follow-up visits  Carry your medicine list with you in case of an emergency  Manage your symptoms:   · Drink liquids as directed to prevent dehydration  Ask how much liquid to drink each day and which liquids are best for you  Ask if you should drink an oral rehydration solution (ORS)  An ORS has the right amounts of water, salts, and sugar you need to replace body fluids  This may help prevent dehydration caused by vomiting or diarrhea  Do not drink liquids with caffeine  Liquids with caffeine can make dehydration worse  · Get plenty of rest to help your body heal   Take naps throughout the day  Ask your healthcare provider when you can return to work and your normal activities  · Use a cool mist humidifier to help you breathe easier  Ask your healthcare provider how to use a cool mist humidifier  · Eat honey or use cough drops for a sore throat  Cough drops are available without a doctor's order  Follow directions for taking cough drops  · Do not smoke or be close to anyone who is smoking  Nicotine and other chemicals in cigarettes and cigars can cause lung damage   Smoking can also delay healing  Ask your healthcare provider for information if you currently smoke and need help to quit  E-cigarettes or smokeless tobacco still contain nicotine  Talk to your healthcare provider before you use these products  Prevent the spread of germs:       · Wash your hands often  Wash your hands several times each day  Wash after you use the bathroom, change a child's diaper, and before you prepare or eat food  Use soap and water every time  Rub your soapy hands together, lacing your fingers  Wash the front and back of your hands, and in between your fingers  Use the fingers of one hand to scrub under the fingernails of the other hand  Wash for at least 20 seconds  Rinse with warm, running water for several seconds  Then dry your hands with a clean towel or paper towel  Use hand  that contains alcohol if soap and water are not available  Do not touch your eyes, nose, or mouth without washing your hands first          · Cover a sneeze or cough  Use a tissue that covers your mouth and nose  Throw the tissue away in a trash can right away  Use the bend of your arm if a tissue is not available  Wash your hands well with soap and water or use a hand   · Stay away from others while you are sick  Avoid crowds as much as possible  · Ask about vaccines you may need  Talk to your healthcare provider about your vaccine history  He or she will tell you which vaccines you need, and when to get them  ? Get the influenza (flu) vaccine as soon as recommended each year  The flu vaccine is available starting in September or October  Flu viruses change, so it is important to get a flu vaccine every year  ? Get the pneumonia vaccine if recommended  This vaccine is usually recommended every 5 years  Your provider will tell you when to get this vaccine, if needed  Follow up with your doctor as directed:  Write down your questions so you remember to ask them during your visits    © Copyright [address] Information is for [employer] use only and may not be sold, redistributed or otherwise used for commercial purposes  All illustrations and images included in CareNotes® are the copyrighted property of [employer] D [employer] M [employer]  or [employer]ck[employer]  The above information is an  only  It is not intended as medical advice for individual conditions or treatments  Talk to your doctor, nurse or pharmacist before following any medical regimen to see if it is safe and effective for you  COVID-19 (Coronavirus Disease 2019)   WHAT YOU NEED TO KNOW:   COVID-19 is the disease caused by the novel (new) coronavirus first discovered in December 2019  Coronaviruses generally cause upper respiratory (nose, throat, and lung) infections, such as a cold  The new virus can also cause serious lower respiratory conditions, such as pneumonia or acute respiratory distress syndrome (ARDS)  Anyone can develop serious problems from the new virus, but your risk is higher if you are 65 or older  A weak immune system, diabetes, or a heart or lung condition can also increase your risk  DISCHARGE INSTRUCTIONS:   If you think you or someone you know may be infected:  Do the following to protect others:  · If emergency care is needed,  tell the  about the possible infection, or call ahead and tell the emergency department  · Call a healthcare provider  for instructions if symptoms are mild  Anyone who may be infected should not  arrive without calling first  The provider will need to protect staff members and other patients  · The person who may be infected needs to wear a face covering  while getting medical care  This will help lower the risk of infecting others  Coverings are not used for anyone who is younger than 2 years, has breathing problems, or cannot remove it  The provider can give you instructions for anyone who cannot wear a covering      Call your local emergency number (911 in the [place]) or go to the emergency department if:   · You have trouble breathing or shortness of breath at rest     · You have chest pain or pressure that lasts longer than 5 minutes  · You become confused or hard to wake  · Your lips or face are blue  · You have a fever of 104°F (40°C) or higher  Call your doctor if:   · You do not  have symptoms of COVID-19 but had close physical contact within 14 days with someone who tested positive  · You have questions or concerns about your condition or care  Medicines: You may need any of the following for mild symptoms:  · Decongestants  help reduce nasal congestion and help you breathe more easily  If you take decongestant pills, they may make you feel restless or cause problems with your sleep  Do not use decongestant sprays for more than a few days  · Cough suppressants  help reduce coughing  Ask your healthcare provider which type of cough medicine is best for you  · Acetaminophen  decreases pain and fever  It is available without a doctor's order  Ask how much to take and how often to take it  Follow directions  Read the labels of all other medicines you are using to see if they also contain acetaminophen, or ask your doctor or pharmacist  Acetaminophen can cause liver damage if not taken correctly  Do not use more than 4 grams (4,000 milligrams) total of acetaminophen in one day  · NSAIDs , such as ibuprofen, help decrease swelling, pain, and fever  NSAIDs can cause stomach bleeding or kidney problems in certain people  If you take blood thinner medicine, always ask your healthcare provider if NSAIDs are safe for you  Always read the medicine label and follow directions  · Take your medicine as directed  Contact your healthcare provider if you think your medicine is not helping or if you have side effects  Tell him or her if you are allergic to any medicine  Keep a list of the medicines, vitamins, and herbs you take   Include the amounts, and when and why you take them  Bring the list or the pill bottles to follow-up visits  Carry your medicine list with you in case of an emergency  How the 2019 coronavirus spreads: The virus spreads quickly and easily  You can become infected if you are in contact with a large amount of the virus, even for a short time  You can also become infected by being around a small amount of virus for a long time  The following are ways the virus is thought to spread, but more information may be coming:  · Droplets are the most common way all coronaviruses spread  The virus can travel in droplets that form when a person talks, coughs, or sneezes  Anyone who breathes in the droplets or gets them in his or her eyes can become infected with the virus  Close personal contact with an infected person is thought to be the main way the virus spreads  Close personal contact means you are within 6 feet (2 meters) of the person  · Person-to-person contact can spread the virus  For example, a person with the virus on his or her hands can spread it by shaking hands with someone  At this time, it does not appear that the virus can be passed to a baby during pregnancy or delivery  The baby can be infected after he or she is born through person-to-person contact  The virus also does not appear to spread in breast milk  If you are pregnant or breastfeeding, talk to your healthcare provider or obstetrician about any concerns you have  · The virus can stay on objects and surfaces  A person can get the virus on his or her hands by touching the object or surface  Infection happens if the person then touches his or her eyes or mouth with unwashed hands  It is not yet known how long the virus can stay on an object or surface  That is why it is important to clean all surfaces that are used regularly  · An infected animal may be able to infect a person who touches it  This may happen at live markets or on a farm      How everyone can lower the risk for COVID-19:  The best way to prevent infection is to avoid anyone who is infected, but this can be hard to do  An infected person can spread the virus before signs or symptoms begin, or even if signs or symptoms never develop  The following can help lower the risk for infection:      · Wash your hands often throughout the day  Use soap and water  Rub your soapy hands together, lacing your fingers  Wash the front and back of each hand, and in between your fingers  Use the fingers of one hand to scrub under the fingernails of the other hand  Wash for at least 20 seconds  Rinse with warm, running water for several seconds  Then dry your hands with a clean towel or paper towel  Use hand  that contains alcohol if soap and water are not available  Do not touch your eyes, nose, or mouth without washing your hands first  Teach children how to wash their hands and use hand   · Cover a sneeze or cough  This prevents droplets from traveling from you to others  Turn your face away and cover your mouth and nose with a tissue  Throw the tissue away  Use the bend of your arm if a tissue is not available  Then wash your hands well with soap and water or use hand   Turn and cover your face if you are around someone who is sneezing or coughing  Teach children how to cover a cough or sneeze  · Follow worldwide, national, and local social distancing guidelines  Social distancing means people avoid close physical contact so the virus cannot spread from one person to another  Keep at least 6 feet (2 meters) between you and others  Also keep this distance from anyone who comes to your home, such as someone making a delivery  · Make a habit of not touching your face  It is not known how long the virus can stay on objects and surfaces  If you get the virus on your hands, you can transfer it to your eyes, nose, or mouth and become infected   You can also transfer it to objects, surfaces, or people  Be aware of what you touch when you go out  Examples include handrails and elevator buttons  Try not to touch anything with bare hands unless it is necessary  Wash your hands before you leave your home and when you return  · Clean and disinfect high-touch surfaces and objects often  Use a disinfecting solution or wipes  You can make a solution by diluting 4 teaspoons of bleach in 1 quart (4 cups) of water  Clean and disinfect even if you think no one living in or coming to your home is infected with the virus  You can wipe items with a disinfecting cloth before you bring them into your home  Wash your hands after you handle anything you bring into your home  · Make your immune system as healthy as possible  A weakened immune system makes you more vulnerable to the new coronavirus  No COVID-19 vaccine is available yet  Vaccines such as the flu and pneumonia vaccines can help your immune system  Your healthcare provider can tell you which vaccines to get, and when to get them  Keep your immune system as strong as possible  Do not smoke  Eat healthy foods, exercise regularly, and try to manage stress  Go to bed and wake up at the same times each day  Social distancing guidelines:  National and local social distancing rules vary  Rules may change over time as restrictions are lifted  Restrictions may return if an outbreak happens where you live  It is important to know and follow all current social distancing rules in your area  The following are general guidelines:  · Limit trips out of your home  You may be able to have food, medicines, and other supplies delivered  If possible, have delivered items left at your door or other area  Try not to have someone hand you an item  You will be so close to the person that the virus can spread between you  · Do not have close physical contact with anyone who does not live in your home    Do not shake hands with, hug, or kiss a person as a greeting  Stand or walk as far from others as possible  If you must use public transportation (such as a bus or subway), try to sit or stand away from others  You can stay safely connected with others through phone calls, e-mail messages, social media websites, and video chats  Check in on anyone who may be having a hard time socially distancing, or who lives alone  Ask administrators at nursing homes or long-term care facilities how you can safely communicate with someone living there  · Wear a cloth face covering around others who do not live in your home  Face coverings help prevent the virus from spreading to others in droplets  You can use a clear face covering if someone needs to read your lips  This is a cloth covering that has plastic over the mouth area so your lips can be seen  Do not use coverings that have breathing valves or vents  The virus can travel out of the valve or vent and be spread to others  Do not take your covering off to talk, cough, or sneeze  Do not use coverings on children younger than 2 years or on anyone who has breathing problems or cannot remove it  · Only allow medical or other necessary professionals into your home  Wear your face covering, and remind professionals to wear a face covering  Remind them to wash their hands when they arrive and before they leave  Do not  let anyone who does not live in your home in, even if the person is not sick  A person can pass the virus to others before symptoms of COVID-19 begin  Some people never even develop symptoms  Children commonly have mild symptoms or no symptoms  It may be hard to tell a child not to hug or kiss you  Explain that this is how he or she can help you stay healthy  · Do not go to someone else's home unless it is necessary  Do not go over to visit, even if the person is lonely  Only go if you need to help him or her  Make sure you both wear face coverings while you are there      · Avoid large gatherings and crowds  Gatherings or crowds of 10 or more individuals can cause the virus to spread  Examples of gatherings include parties, sporting events, [employer] services, and conferences  Crowds may form at beaches, robbins, and tourist attractions  Protect yourself by staying away from large gatherings and crowds  · Ask your healthcare provider for other ways to have appointments  You may be able to have appointments without having to go into the provider's office  Some providers offer phone, video, or other types of appointments  You may also be able to get prescriptions for a few months of your medicines at a time  · Stay safe if you must go out to work  You may have a job that can only be done outside your home  Keep physical distance between you and other workers as much as possible  Follow your employer's rules so everyone stays safe  If you have COVID-19 and are recovering at home:  Healthcare providers will give you specific instructions to follow  The following are general guidelines to remind you how to keep others safe until you are well:  · Wash your hands often  Use soap and water as much as possible  You can use hand  that contains alcohol if soap and water are not available  Do not share towels with anyone  If you use paper towels, throw them away in a lined trash can kept in your room or area  Use a covered trash can, if possible  · Do not go out of your home unless it is necessary  You may have to go to your healthcare provider's office for check-ups or to get prescription refills  Do not arrive at the provider's office without an appointment  Providers have to make their offices safe for staff and other patients  · Do not have close physical contact with anyone unless it is necessary  Only have close physical contact with a person giving direct care, or a baby or child you must care for  Family members and friends should not visit you   If possible, stay in a separate area or room of your home if you live with others  No one should go into the area or room except to give you care  You can visit with others by phone, video chat, e-mail, or similar systems  It is important to stay connected with others in your life while you recover  · Wear a face covering while others are near you  This can help prevent droplets from spreading the virus when you talk, sneeze, or cough  Put the covering on before anyone comes into your room or area  Remind the person to cover his or her nose and mouth before going in to provide care for you  · Do not share items  Do not share dishes, towels, or other items with anyone  Items need to be washed after you use them  · Protect your baby  Wash your hands with soap and water often throughout the day  Wear a clean face covering while you breastfeed, or while you express or pump breast milk  If possible, ask someone who is well to care for your baby  You can put breast milk in bottles for the person to use, if needed  Talk to your healthcare provider if you have any questions or concerns about caring for or bonding with your baby  He or she will tell you when to bring your baby in for check-ups and vaccines  He or she will also tell you what to do if you think your baby was infected with the new virus  · Do not handle live animals  Until more is known, it is best not to touch, play with, or handle live animals  Some animals, including pets, have been infected with the new coronavirus  Do not handle or care for animals until you are well  Care includes feeding, petting, and cuddling your pet  Do not let your pet lick you or share your food  Ask someone who is not infected to take care of your pet, if possible  If you must care for a pet, wear a face covering  Wash your hands before and after you give care  · Follow directions from your healthcare provider for being around others after you recover    You will need to wait at least 10 days after symptoms first appeared  Then you will need to have no fever for 24 hours without fever medicine, and no other symptoms  A loss of taste or smell may continue for several months  It is considered okay to be around others if this is your only symptom  It is not known for sure if or for how long a recovered person can pass the virus to others  Your provider may give you instructions, such as continuing social distancing or wearing a face covering around others  How to take care of someone who has COVID-19:  If the person lives in another home, arrange for a time to give care  Remember to bring a few pairs of disposable gloves and a cloth face covering  The following are general guidelines to help you safely care for anyone who has COVID-19:  · Wash your hands often  Wash before and after you go into the person's home, area, or room  Throw paper towels away in a lined trash can that has a lid, if possible  · Do not allow others to go near the person  No one should come into the person's home unless it is necessary  If possible, the person should be in a separate area or room if he or she lives with others  Keep the room's door shut unless you need to go in or out  Have others call, video chat, or e-mail the person if he or she is feeling well enough  The person may feel lonely if he or she is kept separate for a long period of time  Safe communication can help him or her stay connected to family and friends  · Make sure the person's room has good air flow  You may be able to open the window if the weather allows  An air conditioner can also be turned on to help air move  · Contact the person before you go in to give care  Make sure the person is wearing a face covering  Remind him or her to wash his or her hands with soap and water  He or she can use hand  that contains alcohol if soap and water are not available  Put on a face covering before you go in to give care      · Wear gloves while you give care and clean  Clean items the person uses often  Clean countertops, cooking surfaces, and the fronts and insides of the microwave and refrigerator  Clean the shower, toilet, the area around the toilet, the sink, the area around the sink, and faucets  Gather used laundry or bedding  Wash and dry items on the warmest settings the fabric allows  Wash dishes and silverware in hot, soapy water or in a   · Anything you throw away needs to go into a lined trash can  When you need to empty the trash, close the open end of the lining and tie it closed  This helps prevent items the virus is on from spilling out of the trash  Remove your gloves and throw them away  Wash your hands  Follow up with your doctor as directed:  Write down your questions so you remember to ask them during your visits  For more information:   · Centers for Disease Control and Prevention  1700 Annamarie Mittal , [address]  Phone: [phone]  Web Address: [employer] com br    © Copyright [address] [place] [address] Drive Information is for End User's use only and may not be sold, redistributed or otherwise used for commercial purposes  All illustrations and images included in CareNotes® are the copyrighted property of [employer]  or Keo Mckenzie  The above information is an  only  It is not intended as medical advice for individual conditions or treatments  Talk to your doctor, nurse or pharmacist before following any medical regimen to see if it is safe and effective for you

## 2021-06-17 NOTE — PROGRESS NOTES
[facility]        NAME: Manan Jackson is a 78 y o  male  : 1950    MRN: [id number]  DATE: 2021  TIME: 4:36 PM    Assessment and Plan   Encounter for screening laboratory testing for COVID-19 virus [Z20 [id number]]  1  Encounter for screening laboratory testing for COVID-19 virus  Novel Coronavirus (Covid-19),PCR Formerly named [facility] - Office Collection   2  Viral illness           Patient Instructions     You should self isolate at home until you receive the results  If positive, you should remain on self-quarantine until 10 days after the time of the initial symptoms onset OR 72 hours after resolution of fever (without antipyretics) and symptoms  Tylenol as needed for pain   Increase fluid intake   Follow up with your PCP   Proceed to the ER for worsening symptoms     You can view your results on the Prevac[facility] aurelia  If positive, you need to follow up with your PCP for clearance to return to work  Follow up with PCP in 3-5 days  Proceed to  ER if symptoms worsen  Chief Complaint     Chief Complaint   Patient presents with    Cough     symptoms x 4 days , covid vaccine #2 last week of march     Fever     chills and sweating,     Sore Throat    Headache         History of Present Illness       Patient is a 78year old male presenting with 4 days of headache, body aches, fever, fatigue, and diaphoresis  MaXT 102 1, last night  Denies cough, SOB, N/V/D, abdominal pain, sore throat, or congestion  He has decreased appetite but is drinking fluids  He took an OTC migraine medicine and aspirin  Denies known sick contacts  Received 2 dosed of Covid vaccine, last dose end of March  Review of Systems   Review of Systems   Constitutional: Positive for appetite change, chills and fever  Negative for activity change  HENT: Negative for congestion, ear pain, rhinorrhea, sinus pain and sore throat  Respiratory: Negative for cough and shortness of breath      Gastrointestinal: Negative for nausea and vomiting  Musculoskeletal: Positive for myalgias  Skin: Negative for rash  Neurological: Positive for headaches  Current Medications       Current Outpatient Medications:     lansoprazole (PREVACID) 30 mg capsule, , Disp: , Rfl:     multivitamin (THERAGRAN) TABS, Take 1 tablet daily, Disp: , Rfl:     Current Allergies     Allergies as of 06/17/2021 - Reviewed 06/17/2021   Allergen Reaction Noted    Esomeprazole Other (See Comments) 11/10/2016            The following portions of the patient's history were reviewed and updated as appropriate: allergies, current medications, past family history, past medical history, past social history, past surgical history and problem list      Past Medical History:   Diagnosis Date    Anxiety     GERD (gastroesophageal reflux disease)     HL (hearing loss) Over time    Tinnitus Years ago       Past Surgical History:   Procedure Laterality Date    BACK SURGERY      COLONOSCOPY      FOOT SURGERY Right     hammer toe and bunions    FL COLONOSCOPY FLX DX W/COLLJ SPEC WHEN PFRMD N/A 7/25/2018    Procedure: EGD AND COLONOSCOPY;  Surgeon: Dav Chaparro MD;  Location: [facility]; Service: Gastroenterology    FL EDG US EXAM SURGICAL ALTER STOM DUODENUM/JEJUNUM  10/15/2018    Procedure: RADIAL ENDOSCOPIC U/S;  Surgeon: Dav Chaparro MD;  Location:  GI LAB; Service: Gastroenterology    UPPER GASTROINTESTINAL ENDOSCOPY         Family History   Problem Relation Age of Onset    Lung cancer Mother     Cancer Mother          Medications have been verified  Objective   Pulse 86   Temp 99 9 °F (37 7 °C)   Resp 18   SpO2 98%        Physical Exam     Physical Exam  Vitals reviewed  Constitutional:       General: He is awake  He is not in acute distress  Appearance: Normal appearance  He is well-developed and normal weight  HENT:      Head: Normocephalic        Right Ear: Hearing, tympanic membrane, ear canal and external ear normal       Left Ear: Hearing, tympanic membrane, ear canal and external ear normal       Nose: Nose normal       Mouth/Throat:      Lips: Pink  Pharynx: Oropharynx is clear  Cardiovascular:      Rate and Rhythm: Normal rate and regular rhythm  Heart sounds: Normal heart sounds, S1 normal and S2 normal    Pulmonary:      Effort: Pulmonary effort is normal       Breath sounds: Normal breath sounds  No decreased breath sounds, wheezing, rhonchi or rales  Skin:     General: Skin is warm and moist    Neurological:      General: No focal deficit present  Mental Status: He is alert, oriented to person, place, and time and easily aroused  Psychiatric:         Behavior: Behavior is cooperative

## 2021-06-18 LAB — SARS-COV-2 RNA RESP QL NAA+PROBE: NEGATIVE

## 2021-11-04 ENCOUNTER — PROBLEM (OUTPATIENT)
Dept: URBAN - METROPOLITAN AREA CLINIC 6 | Facility: CLINIC | Age: 71
End: 2021-11-04

## 2021-11-04 DIAGNOSIS — H40.013: ICD-10-CM

## 2021-11-04 DIAGNOSIS — H35.3130: ICD-10-CM

## 2021-11-04 DIAGNOSIS — Z96.1: ICD-10-CM

## 2021-11-04 DIAGNOSIS — H26.493: ICD-10-CM

## 2021-11-04 PROCEDURE — 92015 DETERMINE REFRACTIVE STATE: CPT

## 2021-11-04 PROCEDURE — 92014 COMPRE OPH EXAM EST PT 1/>: CPT

## 2021-11-04 PROCEDURE — 1036F TOBACCO NON-USER: CPT

## 2021-11-04 PROCEDURE — 2019F DILATED MACUL EXAM DONE: CPT

## 2021-11-04 PROCEDURE — 92134 CPTRZ OPH DX IMG PST SGM RTA: CPT

## 2021-11-04 PROCEDURE — G8428 CUR MEDS NOT DOCUMENT: HCPCS

## 2021-11-04 ASSESSMENT — VISUAL ACUITY
OD_PH: 20/25-1
OS_SC: 20/40+1
OS_PH: 20/30-1
OD_SC: 20/40+2

## 2021-11-04 ASSESSMENT — TONOMETRY
OS_IOP_MMHG: 21
OD_IOP_MMHG: 20

## 2021-12-06 ENCOUNTER — SURGERY/PROCEDURE (OUTPATIENT)
Dept: URBAN - METROPOLITAN AREA SURGICAL CENTER 6 | Facility: SURGICAL CENTER | Age: 71
End: 2021-12-06

## 2021-12-06 DIAGNOSIS — Z96.1: ICD-10-CM

## 2021-12-06 DIAGNOSIS — H26.492: ICD-10-CM

## 2021-12-06 PROCEDURE — 66821 AFTER CATARACT LASER SURGERY: CPT

## 2021-12-28 ENCOUNTER — FOLLOW UP (OUTPATIENT)
Dept: URBAN - METROPOLITAN AREA CLINIC 6 | Facility: CLINIC | Age: 71
End: 2021-12-28

## 2021-12-28 DIAGNOSIS — H26.491: ICD-10-CM

## 2021-12-28 DIAGNOSIS — H18.593: ICD-10-CM

## 2021-12-28 DIAGNOSIS — Z96.1: ICD-10-CM

## 2021-12-28 PROCEDURE — 99024 POSTOP FOLLOW-UP VISIT: CPT

## 2021-12-28 PROCEDURE — 1036F TOBACCO NON-USER: CPT

## 2021-12-28 PROCEDURE — G8427 DOCREV CUR MEDS BY ELIG CLIN: HCPCS

## 2021-12-28 ASSESSMENT — VISUAL ACUITY
OS_PH: 20/25
OS_SC: 20/30-2
OD_PH: 20/30-2
OD_SC: 20/50-2

## 2021-12-28 ASSESSMENT — TONOMETRY
OD_IOP_MMHG: 17
OS_IOP_MMHG: 18

## 2022-01-24 ENCOUNTER — SURGERY/PROCEDURE (OUTPATIENT)
Dept: URBAN - METROPOLITAN AREA SURGICAL CENTER 6 | Facility: SURGICAL CENTER | Age: 72
End: 2022-01-24

## 2022-01-24 DIAGNOSIS — H26.491: ICD-10-CM

## 2022-01-24 DIAGNOSIS — Z96.1: ICD-10-CM

## 2022-01-24 PROCEDURE — 66821 AFTER CATARACT LASER SURGERY: CPT | Mod: 79,RT

## 2022-06-01 ENCOUNTER — APPOINTMENT (OUTPATIENT)
Dept: LAB | Age: 72
End: 2022-06-01
Payer: MEDICARE

## 2022-06-01 DIAGNOSIS — N13.8 ENLARGED PROSTATE WITH URINARY OBSTRUCTION: ICD-10-CM

## 2022-06-01 DIAGNOSIS — E78.5 HYPERLIPIDEMIA, UNSPECIFIED HYPERLIPIDEMIA TYPE: ICD-10-CM

## 2022-06-01 DIAGNOSIS — E55.9 AVITAMINOSIS D: ICD-10-CM

## 2022-06-01 DIAGNOSIS — N40.1 ENLARGED PROSTATE WITH URINARY OBSTRUCTION: ICD-10-CM

## 2022-06-01 DIAGNOSIS — Z79.899 ENCOUNTER FOR LONG-TERM (CURRENT) USE OF OTHER MEDICATIONS: ICD-10-CM

## 2022-06-01 LAB
25(OH)D3 SERPL-MCNC: 42.2 NG/ML (ref 30–100)
ALBUMIN SERPL BCP-MCNC: 3.5 G/DL (ref 3.5–5)
ALP SERPL-CCNC: 85 U/L (ref 46–116)
ALT SERPL W P-5'-P-CCNC: 22 U/L (ref 12–78)
ANION GAP SERPL CALCULATED.3IONS-SCNC: 7 MMOL/L (ref 4–13)
AST SERPL W P-5'-P-CCNC: 19 U/L (ref 5–45)
BILIRUB SERPL-MCNC: 0.61 MG/DL (ref 0.2–1)
BUN SERPL-MCNC: 21 MG/DL (ref 5–25)
CALCIUM SERPL-MCNC: 9.1 MG/DL (ref 8.3–10.1)
CHLORIDE SERPL-SCNC: 109 MMOL/L (ref 100–108)
CHOLEST SERPL-MCNC: 216 MG/DL
CO2 SERPL-SCNC: 24 MMOL/L (ref 21–32)
CREAT SERPL-MCNC: 1.1 MG/DL (ref 0.6–1.3)
GFR SERPL CREATININE-BSD FRML MDRD: 68 ML/MIN/1.73SQ M
GLUCOSE P FAST SERPL-MCNC: 102 MG/DL (ref 65–99)
HDLC SERPL-MCNC: 50 MG/DL
LDLC SERPL CALC-MCNC: 141 MG/DL (ref 0–100)
NONHDLC SERPL-MCNC: 166 MG/DL
POTASSIUM SERPL-SCNC: 4.5 MMOL/L (ref 3.5–5.3)
PROT SERPL-MCNC: 7.8 G/DL (ref 6.4–8.2)
PSA SERPL-MCNC: 0.3 NG/ML (ref 0–4)
SODIUM SERPL-SCNC: 140 MMOL/L (ref 136–145)
TRIGL SERPL-MCNC: 126 MG/DL

## 2022-06-01 PROCEDURE — 36415 COLL VENOUS BLD VENIPUNCTURE: CPT

## 2022-06-01 PROCEDURE — 80061 LIPID PANEL: CPT

## 2022-06-01 PROCEDURE — 82306 VITAMIN D 25 HYDROXY: CPT

## 2022-06-01 PROCEDURE — G0103 PSA SCREENING: HCPCS

## 2022-06-01 PROCEDURE — 80053 COMPREHEN METABOLIC PANEL: CPT

## 2022-06-06 ENCOUNTER — APPOINTMENT (OUTPATIENT)
Dept: LAB | Age: 72
End: 2022-06-06
Payer: MEDICARE

## 2022-06-06 DIAGNOSIS — R89.9 ABNORMAL LABORATORY TEST: ICD-10-CM

## 2022-06-06 LAB
BASOPHILS # BLD AUTO: 0.07 THOUSANDS/ΜL (ref 0–0.1)
BASOPHILS NFR BLD AUTO: 1 % (ref 0–1)
EOSINOPHIL # BLD AUTO: 0.34 THOUSAND/ΜL (ref 0–0.61)
EOSINOPHIL NFR BLD AUTO: 5 % (ref 0–6)
ERYTHROCYTE [DISTWIDTH] IN BLOOD BY AUTOMATED COUNT: 14.5 % (ref 11.6–15.1)
HCT VFR BLD AUTO: 39 % (ref 36.5–49.3)
HGB BLD-MCNC: 12.4 G/DL (ref 12–17)
IMM GRANULOCYTES # BLD AUTO: 0.02 THOUSAND/UL (ref 0–0.2)
IMM GRANULOCYTES NFR BLD AUTO: 0 % (ref 0–2)
LYMPHOCYTES # BLD AUTO: 1.67 THOUSANDS/ΜL (ref 0.6–4.47)
LYMPHOCYTES NFR BLD AUTO: 25 % (ref 14–44)
MCH RBC QN AUTO: 26.1 PG (ref 26.8–34.3)
MCHC RBC AUTO-ENTMCNC: 31.8 G/DL (ref 31.4–37.4)
MCV RBC AUTO: 82 FL (ref 82–98)
MONOCYTES # BLD AUTO: 0.59 THOUSAND/ΜL (ref 0.17–1.22)
MONOCYTES NFR BLD AUTO: 9 % (ref 4–12)
NEUTROPHILS # BLD AUTO: 3.88 THOUSANDS/ΜL (ref 1.85–7.62)
NEUTS SEG NFR BLD AUTO: 60 % (ref 43–75)
NRBC BLD AUTO-RTO: 0 /100 WBCS
PLATELET # BLD AUTO: 246 THOUSANDS/UL (ref 149–390)
PMV BLD AUTO: 10.6 FL (ref 8.9–12.7)
RBC # BLD AUTO: 4.76 MILLION/UL (ref 3.88–5.62)
WBC # BLD AUTO: 6.57 THOUSAND/UL (ref 4.31–10.16)

## 2022-06-06 PROCEDURE — 85025 COMPLETE CBC W/AUTO DIFF WBC: CPT

## 2022-06-06 PROCEDURE — 36415 COLL VENOUS BLD VENIPUNCTURE: CPT

## 2022-08-30 ENCOUNTER — PROBLEM (OUTPATIENT)
Dept: URBAN - METROPOLITAN AREA CLINIC 6 | Facility: CLINIC | Age: 72
End: 2022-08-30

## 2022-08-30 DIAGNOSIS — Z96.1: ICD-10-CM

## 2022-08-30 DIAGNOSIS — H18.591: ICD-10-CM

## 2022-08-30 DIAGNOSIS — H35.372: ICD-10-CM

## 2022-08-30 PROCEDURE — 92134 CPTRZ OPH DX IMG PST SGM RTA: CPT

## 2022-08-30 PROCEDURE — 92014 COMPRE OPH EXAM EST PT 1/>: CPT

## 2022-08-30 ASSESSMENT — TONOMETRY
OD_IOP_MMHG: 19
OS_IOP_MMHG: 23

## 2022-08-30 ASSESSMENT — VISUAL ACUITY
OS_PH: 20/30+1
OS_SC: 20/40
OD_PH: 20/30+1
OU_SC: J5
OD_SC: 20/40-1

## 2022-09-16 ENCOUNTER — RX CHECK (OUTPATIENT)
Dept: URBAN - METROPOLITAN AREA CLINIC 6 | Facility: CLINIC | Age: 72
End: 2022-09-16

## 2022-09-16 DIAGNOSIS — H52.11: ICD-10-CM

## 2022-09-16 PROCEDURE — 92015 DETERMINE REFRACTIVE STATE: CPT

## 2022-09-16 ASSESSMENT — VISUAL ACUITY
OS_SC: 20/30
OS_CC: J1
OD_SC: 20/40+1
OD_CC: J1

## 2022-11-01 ENCOUNTER — OFFICE VISIT (OUTPATIENT)
Dept: GASTROENTEROLOGY | Facility: CLINIC | Age: 72
End: 2022-11-01

## 2022-11-01 VITALS
BODY MASS INDEX: 30.29 KG/M2 | HEIGHT: 67 IN | TEMPERATURE: 98.3 F | WEIGHT: 193 LBS | DIASTOLIC BLOOD PRESSURE: 60 MMHG | SYSTOLIC BLOOD PRESSURE: 136 MMHG

## 2022-11-01 DIAGNOSIS — M54.6 MIDLINE THORACIC BACK PAIN, UNSPECIFIED CHRONICITY: ICD-10-CM

## 2022-11-01 DIAGNOSIS — R93.5 ABNORMAL MRI OF THE ABDOMEN: ICD-10-CM

## 2022-11-01 DIAGNOSIS — K63.5 POLYP OF COLON, UNSPECIFIED PART OF COLON, UNSPECIFIED TYPE: ICD-10-CM

## 2022-11-01 DIAGNOSIS — K86.2 PANCREATIC CYST: Primary | ICD-10-CM

## 2022-11-01 NOTE — PROGRESS NOTES
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  - MRI abdomen w wo contrast and mrcp; Future    3  Polyp of colon, unspecified part of colon, unspecified type  He has a history of colon polyps and is due for his next surveillance colonoscopy in 2023  We will plan to have him follow-up in about six months and at that time will schedule his repeat colonoscopy  4  Midline thoracic back pain, unspecified chronicity  He has a history of back pain in the past and now has a new back pain that is radiating from his upper back  I will refer him to Pain Management for further evaluation  In the meantime he can take Tylenol as needed  He is also going to discuss with his primary care physician and may seek orthopedic evaluation   - Ambulatory Referral to Pain Management; Future    ______________________________________________________________________    SUBJECTIVE:  He presents for follow-up of his pancreatic cyst, colon polyps, and left-sided abdominal pain  The abdominal pain has resolved completely but he now complains of back pain  The pain sometimes radiates and tends to be more in his upper back  He denies any nausea, vomiting, reflux, difficulty swallowing, change in bowel habits, bleeding, or weight loss  He has a history of colon polyps and his last colonoscopy was in 2018 and he was asked repeat the procedure in five years  His last MRI of his abdomen was in 2019 and it showed a 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 and tiny pancreatic cyst       REVIEW OF SYSTEMS IS OTHERWISE NEGATIVE        Historical Information   Past Medical History:   Diagnosis Date   • Anxiety    • GERD (gastroesophageal reflux disease)    • HL (hearing loss) Over time   • Tinnitus Years ago     Past Surgical History:   Procedure Laterality Date   • BACK SURGERY     • COLONOSCOPY     • FOOT SURGERY Right     hammer toe and bunions   • ND COLONOSCOPY FLX DX W/COLLJ SPEC WHEN PFRMD N/A 7/25/2018    Procedure: EGD AND COLONOSCOPY;  Surgeon: Lexi Peck MD;  Location: [facility] GI LAB; Service: Gastroenterology   • ND EDG US EXAM SURGICAL ALTER STOM DUODENUM/JEJUNUM  10/15/2018    Procedure: RADIAL ENDOSCOPIC U/S;  Surgeon: Lexi Peck MD;  Location:  GI LAB; Service: Gastroenterology   • UPPER GASTROINTESTINAL ENDOSCOPY       Social History   Social History     Substance and Sexual Activity   Alcohol Use No     Social History     Substance and Sexual Activity   Drug Use No     Social History     Tobacco Use   Smoking Status Former Smoker   • Packs/day: 0 25   • Years: 30 00   • Pack years: 7 50   • Types: Cigarettes   Smokeless Tobacco Never Used   Tobacco Comment    did not smoke much      Family History   Problem Relation Age of Onset   • Lung cancer Mother    • Cancer Mother        Meds/Allergies       Current Outpatient Medications:   •  Cholecalciferol 50 MCG (2000 UT) TABS  •  lansoprazole (PREVACID) 30 mg capsule  •  multivitamin (THERAGRAN) TABS    Allergies   Allergen Reactions   • Esomeprazole Other (See Comments)           Objective     Blood pressure 136/60, temperature 98 3 °F (36 8 °C), temperature source Tympanic, height 5' 7" (1 702 m), weight 87 5 kg (193 lb)  Body mass index is 30 23 kg/m²  PHYSICAL EXAM:      General Appearance:   Alert, cooperative, no distress   HEENT:   Normocephalic, atraumatic, anicteric      Neck:  Supple, symmetrical, trachea midline   Lungs:   Clear to auscultation bilaterally; no rales, rhonchi or wheezing; respirations unlabored    Heart[de-identified]   Regular rate and rhythm; no murmur, rub, or gallop     Abdomen:   Soft, non-tender, non-distended; normal bowel sounds; no masses, no organomegaly    Genitalia:   Deferred    Rectal:   Deferred    Extremities:  No cyanosis, clubbing or edema    Pulses:  2+ and symmetric    Skin:  No jaundice, rashes, or lesions    Lymph nodes:  No palpable cervical lymphadenopathy        Lab Results:   No visits with results within 1 Day(s) from this visit  Latest known visit with results is:   Appointment on 06/06/2022   Component Date Value   • WBC 06/06/2022 6 57    • RBC 06/06/2022 4 76    • Hemoglobin 06/06/2022 12 4    • Hematocrit 06/06/2022 39 0    • MCV 06/06/2022 82    • MCH 06/06/2022 26 1 (A)   • MCHC 06/06/2022 31 8    • RDW 06/06/2022 14 5    • MPV 06/06/2022 10 6    • Platelets 06/32/6380 246    • nRBC 06/06/2022 0    • Neutrophils Relative 06/06/2022 60    • Immat GRANS % 06/06/2022 0    • Lymphocytes Relative 06/06/2022 25    • Monocytes Relative 06/06/2022 9    • Eosinophils Relative 06/06/2022 5    • Basophils Relative 06/06/2022 1    • Neutrophils Absolute 06/06/2022 3 88    • Immature Grans Absolute 06/06/2022 0 02    • Lymphocytes Absolute 06/06/2022 1 67    • Monocytes Absolute 06/06/2022 0 59    • Eosinophils Absolute 06/06/2022 0 34    • Basophils Absolute 06/06/2022 0 07          Radiology Results:   No results found

## 2022-11-07 ENCOUNTER — APPOINTMENT (OUTPATIENT)
Dept: LAB | Age: 72
End: 2022-11-07

## 2022-11-07 DIAGNOSIS — K86.2 PANCREATIC CYST: ICD-10-CM

## 2022-11-07 DIAGNOSIS — R93.5 ABNORMAL MRI OF THE ABDOMEN: ICD-10-CM

## 2022-11-07 LAB
ANION GAP SERPL CALCULATED.3IONS-SCNC: 7 MMOL/L (ref 4–13)
BUN SERPL-MCNC: 18 MG/DL (ref 5–25)
CALCIUM SERPL-MCNC: 9.1 MG/DL (ref 8.3–10.1)
CHLORIDE SERPL-SCNC: 108 MMOL/L (ref 96–108)
CO2 SERPL-SCNC: 25 MMOL/L (ref 21–32)
CREAT SERPL-MCNC: 1.2 MG/DL (ref 0.6–1.3)
GFR SERPL CREATININE-BSD FRML MDRD: 60 ML/MIN/1.73SQ M
GLUCOSE SERPL-MCNC: 92 MG/DL (ref 65–140)
POTASSIUM SERPL-SCNC: 4.2 MMOL/L (ref 3.5–5.3)
SODIUM SERPL-SCNC: 140 MMOL/L (ref 135–147)

## 2022-11-12 ENCOUNTER — HOSPITAL ENCOUNTER (OUTPATIENT)
Dept: RADIOLOGY | Facility: HOSPITAL | Age: 72
Discharge: HOME | End: 2022-11-12
Attending: INTERNAL MEDICINE

## 2022-11-12 DIAGNOSIS — R93.5 ABNORMAL MRI OF THE ABDOMEN: ICD-10-CM

## 2022-11-12 DIAGNOSIS — K86.2 PANCREATIC CYST: ICD-10-CM

## 2022-11-12 RX ADMIN — GADOBUTROL 8 ML: 604.72 INJECTION INTRAVENOUS at 07:16

## 2022-11-18 NOTE — RESULT ENCOUNTER NOTE
I called him with his results  Good news his pancreatic cyst decreased in size and his splenule is unchanged so we won't plan for repeat MRI unless he develops new symptoms

## 2023-06-08 ENCOUNTER — TELEPHONE (OUTPATIENT)
Dept: GASTROENTEROLOGY | Facility: CLINIC | Age: 73
End: 2023-06-08

## 2023-06-08 NOTE — TELEPHONE ENCOUNTER
Patients GI provider:  Dr Sana Pham    Number to return call: [phone]    Reason for call: Pt calling schedule a colonoscopy with Dr Sana Pham    Scheduled procedure/appointment date if applicable: n/a

## 2023-06-09 ENCOUNTER — PREP FOR PROCEDURE (OUTPATIENT)
Dept: GASTROENTEROLOGY | Facility: CLINIC | Age: 73
End: 2023-06-09

## 2023-06-09 DIAGNOSIS — K63.5 POLYP OF COLON, UNSPECIFIED PART OF COLON, UNSPECIFIED TYPE: Primary | ICD-10-CM

## 2023-06-09 RX ORDER — POLYETHYLENE GLYCOL 3350, SODIUM CHLORIDE, SODIUM BICARBONATE, POTASSIUM CHLORIDE 420; 11.2; 5.72; 1.48 G/4L; G/4L; G/4L; G/4L
4000 POWDER, FOR SOLUTION ORAL ONCE
Qty: 4000 ML | Refills: 0 | Status: SHIPPED | OUTPATIENT
Start: 2023-06-09 | End: 2023-06-09

## 2023-06-09 NOTE — TELEPHONE ENCOUNTER
Pt called back because his PCP wants him to have an EGD as well because he is having some reflux issues   He would like to know if he can schedule both the EGD and the colonoscopy or if he has to be seen first  His contact number is [phone]

## 2023-06-10 ENCOUNTER — PREP FOR PROCEDURE (OUTPATIENT)
Dept: GASTROENTEROLOGY | Facility: CLINIC | Age: 73
End: 2023-06-10

## 2023-06-10 DIAGNOSIS — K21.9 GASTROESOPHAGEAL REFLUX DISEASE WITHOUT ESOPHAGITIS: Primary | ICD-10-CM

## 2023-06-12 ENCOUNTER — TELEPHONE (OUTPATIENT)
Dept: GASTROENTEROLOGY | Facility: CLINIC | Age: 73
End: 2023-06-12

## 2023-06-12 NOTE — TELEPHONE ENCOUNTER
Scheduled date of EGD/colonoscopy (as of today):08 15 23  Physician performing EGD/colonoscopy:DR RAMIREZ  Location of EGD/colonoscopy:[facility]  Desired bowel prep reviewed with patient:VERÓNICA  Instructions reviewed with patient by:MAILED  Clearances:  N/A

## 2023-06-13 ENCOUNTER — APPOINTMENT (OUTPATIENT)
Dept: LAB | Age: 73
End: 2023-06-13
Payer: MEDICARE

## 2023-06-13 DIAGNOSIS — N13.8 ENLARGED PROSTATE WITH URINARY OBSTRUCTION: ICD-10-CM

## 2023-06-13 DIAGNOSIS — E78.49 OTHER HYPERLIPIDEMIA: ICD-10-CM

## 2023-06-13 DIAGNOSIS — N40.1 ENLARGED PROSTATE WITH URINARY OBSTRUCTION: ICD-10-CM

## 2023-06-13 DIAGNOSIS — R25.2 CRAMP OF LIMB: ICD-10-CM

## 2023-06-13 DIAGNOSIS — Z79.899 ENCOUNTER FOR LONG-TERM (CURRENT) USE OF OTHER MEDICATIONS: ICD-10-CM

## 2023-06-13 DIAGNOSIS — E55.9 AVITAMINOSIS D: ICD-10-CM

## 2023-06-13 LAB
25(OH)D3 SERPL-MCNC: 29.5 NG/ML (ref 30–100)
ALBUMIN SERPL BCP-MCNC: 3.4 G/DL (ref 3.5–5)
ALP SERPL-CCNC: 81 U/L (ref 46–116)
ALT SERPL W P-5'-P-CCNC: 29 U/L (ref 12–78)
ANION GAP SERPL CALCULATED.3IONS-SCNC: 5 MMOL/L (ref 4–13)
AST SERPL W P-5'-P-CCNC: 23 U/L (ref 5–45)
BILIRUB SERPL-MCNC: 0.52 MG/DL (ref 0.2–1)
BUN SERPL-MCNC: 21 MG/DL (ref 5–25)
CALCIUM ALBUM COR SERPL-MCNC: 9.9 MG/DL (ref 8.3–10.1)
CALCIUM SERPL-MCNC: 9.4 MG/DL (ref 8.3–10.1)
CHLORIDE SERPL-SCNC: 111 MMOL/L (ref 96–108)
CHOLEST SERPL-MCNC: 227 MG/DL
CO2 SERPL-SCNC: 26 MMOL/L (ref 21–32)
CREAT SERPL-MCNC: 1.13 MG/DL (ref 0.6–1.3)
GFR SERPL CREATININE-BSD FRML MDRD: 65 ML/MIN/1.73SQ M
GLUCOSE P FAST SERPL-MCNC: 93 MG/DL (ref 65–99)
HDLC SERPL-MCNC: 42 MG/DL
LDLC SERPL CALC-MCNC: 152 MG/DL (ref 0–100)
MAGNESIUM SERPL-MCNC: 2.5 MG/DL (ref 1.6–2.6)
NONHDLC SERPL-MCNC: 185 MG/DL
POTASSIUM SERPL-SCNC: 4.3 MMOL/L (ref 3.5–5.3)
PROT SERPL-MCNC: 7.3 G/DL (ref 6.4–8.4)
PSA SERPL-MCNC: 0.18 NG/ML (ref 0–4)
SODIUM SERPL-SCNC: 142 MMOL/L (ref 135–147)
TRIGL SERPL-MCNC: 164 MG/DL

## 2023-06-13 PROCEDURE — 36415 COLL VENOUS BLD VENIPUNCTURE: CPT

## 2023-06-13 PROCEDURE — 82306 VITAMIN D 25 HYDROXY: CPT

## 2023-06-13 PROCEDURE — G0103 PSA SCREENING: HCPCS

## 2023-06-13 PROCEDURE — 80053 COMPREHEN METABOLIC PANEL: CPT

## 2023-06-13 PROCEDURE — 83735 ASSAY OF MAGNESIUM: CPT

## 2023-06-13 PROCEDURE — 80061 LIPID PANEL: CPT

## 2023-06-22 ENCOUNTER — APPOINTMENT (OUTPATIENT)
Dept: LAB | Age: 73
End: 2023-06-22
Payer: MEDICARE

## 2023-06-22 DIAGNOSIS — R89.9 ABNORMAL PLEURAL FLUID: ICD-10-CM

## 2023-06-22 LAB
ERYTHROCYTE [DISTWIDTH] IN BLOOD BY AUTOMATED COUNT: 13.6 % (ref 11.6–15.1)
HCT VFR BLD AUTO: 42 % (ref 36.5–49.3)
HGB BLD-MCNC: 13.3 G/DL (ref 12–17)
MCH RBC QN AUTO: 27.8 PG (ref 26.8–34.3)
MCHC RBC AUTO-ENTMCNC: 31.7 G/DL (ref 31.4–37.4)
MCV RBC AUTO: 88 FL (ref 82–98)
PLATELET # BLD AUTO: 231 THOUSANDS/UL (ref 149–390)
PMV BLD AUTO: 11.3 FL (ref 8.9–12.7)
RBC # BLD AUTO: 4.79 MILLION/UL (ref 3.88–5.62)
WBC # BLD AUTO: 6.16 THOUSAND/UL (ref 4.31–10.16)

## 2023-06-22 PROCEDURE — 85027 COMPLETE CBC AUTOMATED: CPT

## 2023-06-22 PROCEDURE — 36415 COLL VENOUS BLD VENIPUNCTURE: CPT

## 2023-07-18 ENCOUNTER — TELEPHONE (OUTPATIENT)
Dept: GASTROENTEROLOGY | Facility: MEDICAL CENTER | Age: 73
End: 2023-07-18

## 2023-07-18 NOTE — TELEPHONE ENCOUNTER
Pt called to request an alternative for his bowel prep, Golytely, as he states that he cannot drink that much liquid. Please advise, thank you!

## 2023-07-24 ENCOUNTER — TELEPHONE (OUTPATIENT)
Dept: GENETICS | Facility: CLINIC | Age: 73
End: 2023-07-24

## 2023-07-24 NOTE — TELEPHONE ENCOUNTER
Pedrito Manzano called to schedule a new patient appointment with the Cancer Risk and Genetics Program.      Outcome:  Genetics appointment scheduled for 8/31 at 1:00 pm. Joint appt with his wife. Personal/Family History Related to Appointment:  No phx of cancer. Fhx of lung ca, breast ca, testicular ca, non-Rastafari  History of Genetic Testing:  Patient reports family history of genetic testing.  daughter Susan Mcdowell    Genetics Family History Questionnaire:  I confirmed the patient's e-mail on file as the best e-mail to send an invite link for our genetics family history intake

## 2023-08-01 ENCOUNTER — ANESTHESIA (OUTPATIENT)
Dept: ANESTHESIOLOGY | Facility: HOSPITAL | Age: 73
End: 2023-08-01

## 2023-08-01 ENCOUNTER — ANESTHESIA EVENT (OUTPATIENT)
Dept: ANESTHESIOLOGY | Facility: HOSPITAL | Age: 73
End: 2023-08-01

## 2023-08-15 ENCOUNTER — ANESTHESIA (OUTPATIENT)
Dept: GASTROENTEROLOGY | Facility: AMBULARY SURGERY CENTER | Age: 73
End: 2023-08-15

## 2023-08-15 ENCOUNTER — HOSPITAL ENCOUNTER (OUTPATIENT)
Dept: GASTROENTEROLOGY | Facility: AMBULARY SURGERY CENTER | Age: 73
Setting detail: OUTPATIENT SURGERY
Discharge: HOME | End: 2023-08-15
Attending: INTERNAL MEDICINE
Payer: MEDICARE

## 2023-08-15 ENCOUNTER — ANESTHESIA EVENT (OUTPATIENT)
Dept: GASTROENTEROLOGY | Facility: AMBULARY SURGERY CENTER | Age: 73
End: 2023-08-15

## 2023-08-15 VITALS
RESPIRATION RATE: 20 BRPM | TEMPERATURE: 97 F | BODY MASS INDEX: 30.29 KG/M2 | OXYGEN SATURATION: 98 % | HEIGHT: 67 IN | HEART RATE: 65 BPM | SYSTOLIC BLOOD PRESSURE: 115 MMHG | WEIGHT: 193 LBS | DIASTOLIC BLOOD PRESSURE: 63 MMHG

## 2023-08-15 DIAGNOSIS — K21.9 GASTROESOPHAGEAL REFLUX DISEASE WITHOUT ESOPHAGITIS: ICD-10-CM

## 2023-08-15 DIAGNOSIS — K63.5 POLYP OF COLON, UNSPECIFIED PART OF COLON, UNSPECIFIED TYPE: ICD-10-CM

## 2023-08-15 PROCEDURE — 88305 TISSUE EXAM BY PATHOLOGIST: CPT | Performed by: PATHOLOGY

## 2023-08-15 RX ORDER — LIDOCAINE HYDROCHLORIDE 10 MG/ML
INJECTION, SOLUTION EPIDURAL; INFILTRATION; INTRACAUDAL; PERINEURAL AS NEEDED
Status: DISCONTINUED | OUTPATIENT
Start: 2023-08-15 | End: 2023-08-15

## 2023-08-15 RX ORDER — SODIUM CHLORIDE, SODIUM LACTATE, POTASSIUM CHLORIDE, CALCIUM CHLORIDE 600; 310; 30; 20 MG/100ML; MG/100ML; MG/100ML; MG/100ML
INJECTION, SOLUTION INTRAVENOUS CONTINUOUS PRN
Status: DISCONTINUED | OUTPATIENT
Start: 2023-08-15 | End: 2023-08-15

## 2023-08-15 RX ORDER — PROPOFOL 10 MG/ML
INJECTION, EMULSION INTRAVENOUS AS NEEDED
Status: DISCONTINUED | OUTPATIENT
Start: 2023-08-15 | End: 2023-08-15

## 2023-08-15 RX ORDER — GABAPENTIN 300 MG/1
300 CAPSULE ORAL 3 TIMES DAILY
COMMUNITY

## 2023-08-15 RX ADMIN — PROPOFOL 160 MCG/KG/MIN: 10 INJECTION, EMULSION INTRAVENOUS at 10:13

## 2023-08-15 RX ADMIN — PROPOFOL 100 MG: 10 INJECTION, EMULSION INTRAVENOUS at 10:12

## 2023-08-15 RX ADMIN — SODIUM CHLORIDE, SODIUM LACTATE, POTASSIUM CHLORIDE, AND CALCIUM CHLORIDE: .6; .31; .03; .02 INJECTION, SOLUTION INTRAVENOUS at 09:22

## 2023-08-15 RX ADMIN — LIDOCAINE HYDROCHLORIDE 50 MG: 10 INJECTION, SOLUTION EPIDURAL; INFILTRATION; INTRACAUDAL; PERINEURAL at 10:12

## 2023-08-15 NOTE — ANESTHESIA PREPROCEDURE EVALUATION
Review of Systems/Medical History  Patient summary reviewed. Chart reviewed. No history of anesthetic complications     Cardiovascular   Pulmonary       GI/Hepatic    GERD well controlled, Pancreatic problem,             Endo/Other     GYN       Hematology   Musculoskeletal       Neurology   Psychology   Anxiety,              Physical Exam    Airway    Mallampati score: II  TM Distance: >3 FB  Neck ROM: full     Dental   No notable dental hx     Cardiovascular      Pulmonary      Other Findings        Anesthesia Plan  ASA Score- 2     Anesthesia Type- IV sedation with anesthesia with ASA Monitors. Additional Monitors:   Airway Plan:           Plan Factors-    Chart reviewed. Patient summary reviewed. Induction- intravenous. Postoperative Plan-     Informed Consent- Anesthetic plan and risks discussed with patient. I personally reviewed this patient with the CRNA. Discussed and agreed on the Anesthesia Plan with the CRNA. Justa Gill

## 2023-08-15 NOTE — ANESTHESIA POSTPROCEDURE EVALUATION
Post-Op Assessment Note    CV Status:  Stable  Pain Score: 0    Pain management: adequate     Mental Status:  Sleepy   Hydration Status:  Euvolemic   PONV Controlled:  Controlled   Airway Patency:  Patent      Post Op Vitals Reviewed: Yes      Staff: CRNA   Comments: vss sv nonobstructed uneventful        No notable events documented.     /61 (08/15/23 1041)    Temp (!) 97 °F (36.1 °C) (08/15/23 1041)    Pulse 62 (08/15/23 1041)   Resp 12 (08/15/23 1041)    SpO2 96 % (08/15/23 1041)

## 2023-08-15 NOTE — H&P
History and Physical - [facility]  Silvio Damon 81 y.o. male MRN: [id number]                  HPI: Silvio Damon is a 81y.o. year old male who presents for reflux and colon polyps. REVIEW OF SYSTEMS: Per the HPI, and otherwise unremarkable. Historical Information   Past Medical History:   Diagnosis Date   • Anxiety    • Colon polyp    • GERD (gastroesophageal reflux disease)    • HL (hearing loss) Over time   • Tinnitus Years ago     Past Surgical History:   Procedure Laterality Date   • BACK SURGERY     • COLONOSCOPY     • FOOT SURGERY Right     hammer toe and bunions   • WA COLONOSCOPY FLX DX W/COLLJ SPEC WHEN PFRMD N/A 7/25/2018    Procedure: EGD AND COLONOSCOPY;  Surgeon: Gui Hall MD;  Location: [facility] GI LAB; Service: Gastroenterology   • WA EDG US EXAM SURGICAL ALTER STOM DUODENUM/JEJUNUM  10/15/2018    Procedure: RADIAL ENDOSCOPIC U/S;  Surgeon: Gui Hall MD;  Location:  GI LAB;   Service: Gastroenterology   • UPPER GASTROINTESTINAL ENDOSCOPY       Social History   Social History     Substance and Sexual Activity   Alcohol Use No     Social History     Substance and Sexual Activity   Drug Use No     Social History     Tobacco Use   Smoking Status Former   • Packs/day: 0.25   • Years: 30.00   • Total pack years: 7.50   • Types: Cigarettes   Smokeless Tobacco Never   Tobacco Comments    did not smoke much      Family History   Problem Relation Age of Onset   • Lung cancer Mother    • Cancer Mother        Meds/Allergies       Current Outpatient Medications:   •  gabapentin (NEURONTIN) 300 mg capsule  •  Cholecalciferol 50 MCG (2000 UT) TABS  •  lansoprazole (PREVACID) 30 mg capsule  •  multivitamin (THERAGRAN) TABS    Allergies   Allergen Reactions   • Esomeprazole Other (See Comments)       Objective     /61   Pulse 55   Temp (!) 97 °F (36.1 °C) (Temporal)   Resp 16   Ht 5' 7" (1.702 m)   Wt 87.5 kg (193 lb)   SpO2 96%   BMI 30.23 kg/m² PHYSICAL EXAM    Gen: NAD  Head: NCAT  CV: RRR  CHEST: Clear  ABD: soft, NT/ND  EXT: no edema      ASSESSMENT/PLAN:  This is a 81y.o. year old male here for upper endoscopy and colonoscopy, and he is stable and optimized for his procedure.

## 2023-08-17 PROCEDURE — 88305 TISSUE EXAM BY PATHOLOGIST: CPT | Performed by: PATHOLOGY

## 2023-08-21 NOTE — RESULT ENCOUNTER NOTE
Polyp was an adenoma (precancerous but no cancer) so repeat colonoscopy in 5 years. This was communicated via [address] [place] [address].

## 2023-08-28 ENCOUNTER — TELEPHONE (OUTPATIENT)
Dept: GENETICS | Facility: CLINIC | Age: 73
End: 2023-08-28

## 2023-08-31 ENCOUNTER — CLINICAL SUPPORT (OUTPATIENT)
Dept: GENETICS | Facility: CLINIC | Age: 73
End: 2023-08-31

## 2023-08-31 DIAGNOSIS — Z84.81 FAMILY HISTORY OF GENETIC DISEASE CARRIER: Primary | ICD-10-CM

## 2023-08-31 NOTE — PROGRESS NOTES
Pre-Test Genetic Counseling Consult Note    Patient Name: Elin Eli   /Age: 1950/72 y.o. Referring Provider: Self-referred    Date of Service: 2023  Genetic Counselor: Nadja Carlisle MS, [facility]  Interpretation Services: None  Location: In-person consult at [facility] [place] 2]CARE of Visit: 61 Minutes    Gayatri Fleming was referred to the [address] [place], [place] [address] and Genetic Assessment Program due to his familial BRCA1 mutation. he presents today to discuss the possibility of a hereditary cancer syndrome, options for genetic testing, and implications for him and his family. His wife, Flor Garner, accompanied him to the appointment. Gayatri Fleming called his daughter, Cameron Reynolds, and we received verbal consent to access her genetic test results. Cancer History and Treatment:   Personal History: no personal history of cancer  BCC on torso and scalp, s/p excision     Screening Hx:   Colon:  Colonoscopy (): 2 subcentimeter polyps  F/u recommended in 5 years     Pathology:   Polyp, Colorectal, cold polypectomy: cecal polyp:  Tubular adenoma; negative for high grade dysplasia and malignancy     Polyp, Colorectal, cold poylpectomy: sigmoid colon polyp:  Polypoid fragment of colon mucosa with hyperplastic change    Colonoscopy (): 2 tubular adenomas     Cumulative polyps: 4     Skin:  Sees derm annually    Prostate:  Prostate screening (): 0.18 ng/mL  Screened annually since      Other screening:   MRI Abdomen & MRCP (2022): IMPRESSION:  -2 mm pancreatic head cyst is decreased in size from 3 to 4 mm on 2018 study. F/u recommended in 2 years    -Unchanged 17 mm splenule abutting the pancreatic tail stable since 2018 is a benign finding not necessitating further follow-up. EGD ():   IMPRESSION:  2 cm type I hiatal hernia  The esophagus and duodenum appeared normal.  Performed forceps biopsies in the upper third of the esophagus and lower third of the esophagus to rule out eosinophilic esophagitis      Medical and Surgical History  Pertinent surgical history:   Past Surgical History:   Procedure Laterality Date   • BACK SURGERY     • COLONOSCOPY     • FOOT SURGERY Right     hammer toe and bunions   • OH COLONOSCOPY FLX DX W/COLLJ SPEC WHEN PFRMD N/A 7/25/2018    Procedure: EGD AND COLONOSCOPY;  Surgeon: Rosaline Rocha MD;  Location: [facility] GI LAB; Service: Gastroenterology   • OH EDG US EXAM SURGICAL ALTER STOM DUODENUM/JEJUNUM  10/15/2018    Procedure: RADIAL ENDOSCOPIC U/S;  Surgeon: Rosaline Rocha MD;  Location:  GI LAB; Service: Gastroenterology   • UPPER GASTROINTESTINAL ENDOSCOPY        Pertinent medical history:  Past Medical History:   Diagnosis Date   • Anxiety    • Colon polyp    • GERD (gastroesophageal reflux disease)    • HL (hearing loss) Over time   • Tinnitus Years ago         Other History:  Height:   Ht Readings from Last 1 Encounters:   08/15/23 5' 7" (1.702 m)     Weight:   Wt Readings from Last 1 Encounters:   08/15/23 87.5 kg (193 lb)       Relevant Family History   Patient reports non-Ashkenazi Judaism ancestry. Daughter: lymphoma diagnosed at 28; DCIS diagnosed at 36, s/p lumpectomy; BRCA1 pathogenic variant, specifically c.5266dupC ([id number]*61)    Maternal Family History: Mother: lung cancer diagnosed at 61, heavy smoker (d.65)   Aunt: lung cancer, smoker (currently 79 y/o)   Aunt: ? Brain or esophageal cancer (d.37)   Limited information regarding family history and structure     Paternal Family History:  Father: ? Bone cancer diagnosed at 68 (d.76)   Limited information regarding family history and structure     Please refer to the scanned pedigree in the Media Tab for a complete family history     *All history is reported as provided by the patient; records are not available for review, except where indicated. Assessment:  We explained that the likelihood that Meagan Meredith harbors the same BRCA1 pathogenic variant identified in his daughter is 50%.       We reviewed the cancer risks and NCCN screening recommendations available if Geo Garcia tests positive for the familial variant. Genetic testing is indicated for Geo Garcia based on the following criteria: Meets NCCN V2.2023 Testing Criteria for High-Penetrance Breast Cancer Susceptibility Genes: blood relative (daughter) with BRCA1 pathogenic variant; specifically c.5266dupC ([id number]*72)     Genetic testing for hereditary cancer is available via single gene analysis or panel testing of multiple genes associated with an increased risk for cancer. Geo Garcia was given the option to test for the BRCA1 variant only or to test using a larger hereditary cancer panel. Andrea Lauren elected to pursue testing with a larger hereditary cancer panel to rule out any additional hereditary cancer syndromes given his limited information regarding family history and structure on his maternal and paternal side. The risks, benefits, and limitations of genetic testing were reviewed with the patient. Geo Garcia understands that this test can only test for the hereditary cancer syndromes and cannot provide a cancer diagnosis. We reviewed that cancers such as lung cancer, liver cancer, cervical cancer, and testicular cancer very rarely have a genetic etiology and we cannot test for a genetic predisposition for these cancers at this time. We reviewed the genetic discrimination laws, possible test results (positive, negative, variants of uncertain significance), and their clinical implications. If positive for a mutation, options for managing cancer risk including increased surveillance, chemoprevention, and in some cases prophylactic surgery were discussed. Geo Garcia was informed that if a hereditary cancer syndrome was identified, first-degree relatives (parents, siblings, and children) have a chance of also inheriting the condition.  Genetic testing would allow for predictive genetic testing in other relatives, who may also be at risk depending on their degree of relation. Lian Dang understands there is a 50% chance that his son has the familial BRCA1 pathogenic variant as well. We discussed that screening recommendations begin at 27 y/o for men with this variant. Lian Dang mentioned that his son is aware of his sister's test result and intends to pursue testing soon. Lian Dang understands that a negative genetic test result does not erase the general population risk for cancer. We discussed that many factors that influence our risk for cancer such as age, environmental exposures, lifestyle choices, and family history. Billing:  Most individuals pay <$100 for hereditary cancer genetic testing. If insurance covers the cost of the testing, individuals may still pay out of pocket secondary to co-pays, co-insurance, or deductibles. If the cost of the testing exceeds $100, the lab will reach out to the patient via phone or e-mail. The patient will then have the option to proceed with the testing, cancel the testing, or elect the self-pay option of $250. Lian Dang verbalized understanding. Plan: Patient decided to proceed with testing and provided consent.     Summary:     Sample Collection:  The patient was given a blood kit and instructed to go to a [facility] lab    Genetic Testing Preformed: [employer] CustomNext Cancer Panel + MelanomaNext + RNA (51 genes): APC, NAAMIKA, AXIN2, BAP1, BARD1, BMPR1A, BRCA1, BRCA2, BRIP1, CDH1, CDK4, CDKN2A, CHEK2, CTNNA1, DICER1, EPCAM, GREM1, HOXB13, KIT, MEN1, MITF, MLH1, MSH2, MSH3, MSH6, MUTYH, NBN, NF1, NTHL1, PALB2, PDGFRA, POT1, PMS2, POLD1, POLE, PTEN, RAD50, RAD51C, RAD51D, RB1, SDHA, SDHB, SDHC, SDHD, SMAD4, SMARCA4, STK11, TP53, TSC1, TSC2, VHL    Result Call Information:  I confirmed the patient's mobile number on file as the best number to call with results  I confirmed with the patient that we can leave a voicemail on his mobile number    Results take approximately 2-3 weeks to complete once test is started. Gayatri Fleming will be notified via [employer] once results are available. Additional recommendations for surveillance/medical management will be made pending genetic test results.

## 2023-09-01 ENCOUNTER — APPOINTMENT (OUTPATIENT)
Dept: LAB | Age: 73
End: 2023-09-01
Payer: COMMERCIAL

## 2023-09-01 DIAGNOSIS — F41.9 ANXIETY HYPERVENTILATION: ICD-10-CM

## 2023-09-01 DIAGNOSIS — Z84.81 FAMILY HISTORY OF GENETIC DISEASE CARRIER: ICD-10-CM

## 2023-09-01 DIAGNOSIS — F45.8 ANXIETY HYPERVENTILATION: ICD-10-CM

## 2023-09-01 LAB — TSH SERPL DL<=0.05 MIU/L-ACNC: 1.76 UIU/ML (ref 0.45–4.5)

## 2023-09-01 PROCEDURE — 36415 COLL VENOUS BLD VENIPUNCTURE: CPT

## 2023-09-01 PROCEDURE — 84443 ASSAY THYROID STIM HORMONE: CPT

## 2023-09-21 ENCOUNTER — TELEPHONE (OUTPATIENT)
Dept: GENETICS | Facility: CLINIC | Age: 73
End: 2023-09-21

## 2023-09-21 DIAGNOSIS — Z13.71: Primary | ICD-10-CM

## 2023-09-21 NOTE — TELEPHONE ENCOUNTER
Post-Test Genetic Counseling Consult Note  Today I spoke with Geo Garcia over the phone to review the results of his genetic test for hereditary cancer. We met previously on 8/31/23 for pre-test counseling. SUMMARY:    Test(s): [employer] CustomNext Cancer Panel + MelanomaNext + RNA (51 genes): APC, ANAMIKA, AXIN2, BAP1, BARD1, BMPR1A, BRCA1, BRCA2, BRIP1, CDH1, CDK4, CDKN2A, CHEK2, CTNNA1, DICER1, EPCAM, GREM1, HOXB13, KIT, MEN1, MITF, MLH1, MSH2, MSH3, MSH6, MUTYH, NBN, NF1, NTHL1, PALB2, PDGFRA, POT1, PMS2, POLD1, POLE, PTEN, RAD50, RAD51C, RAD51D, RB1, SDHA, SDHB, SDHC, SDHD, SMAD4, SMARCA4, STK11, TP53, TSC1, TSC2, VHL     Result: Positive- BRCA1 c.5266dupC (M.[id number]*02), heterozygous, pathogenic     Additional Finding:   Variant of Uncertain Significance- BARD1 c.928T>G (p.S310A), heterozygous, uncertain significance    BRCA1 Assessment:   Geo Garcia carries one pathogenic variant in the BRCA1 gene, specifically c.5266dupC (C.[id number]*78). The BRCA1 gene is associated with autosomal dominant hereditary breast and ovarian cancer (HBOC) syndrome ([facility]: [id number]). 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 Hereditary breast and ovarian cancer (HBOC) syndrome  Women with a single BRCA1 pathogenic variant have approximately a 40-87% lifetime risk of breast cancer. The risk for a second primary breast cancer within 5 years of the first breast cancer diagnosis is approximately 10-15%. Women also have up to a 16-59% lifetime risk for ovarian, fallopian tube, or peritoneal cancer to age 79. Men with a BRCA1 pathogenic variant have up to a .2-1.2% risk for breast cancer and up to a 7-26% risk for prostate cancer. Men and women also have an elevated risk for melanoma and up to a ?5% risk for pancreatic cancer. Risks and Testing for Family Members: This test result may help clarify the risk for other family members to develop cancer.  All first-degree relatives (parents, siblings and children) have up to a 50% chance to test positive for the familial BRCA1 pathogenic variant. Other relatives such as aunts, uncles and cousins may also be at risk. Since it is not known with one-hundred percent certainty which side of the family this BRCA1 pathogenic variant came from, we recommend that Miranda Cash share this test results with extended family members from both sides of her family. If Miranda Cash family members have any questions or are interested in testing they can reach out to the [employer] number at [phone] for additional information. Variant of Uncertain Significance Assessment:  A variant of uncertain significance (VUS) means that a change was identified in a specific gene but it cannot be determined whether the variant is associated with an increased risk of cancer or is a harmless genetic change. The significance of the BARD1 variant is currently not known and therefore this test result cannot be used to help determine Lei's cancer risks. It is possible that the variant was seen in only a handful of individuals, or there may be conflicting or incomplete information in the medical literature about the variant and its association with hereditary cancer. Genetic testing for the BARD1 variant is not recommended for relatives who wish to determine their cancer risks for purposes of determining medical management. The presence or absence of this variant in a relative is not clinically meaningful unless the variant is reclassified in the future. The laboratory will continue to accumulate information on this variant and will reclassify it as either a positive or negative genetic test result when they are confident that they have adequate information. As updated information is obtained, we will notify Miranda Cash with the updated information.  It is important to note that the majority of variants of uncertain significance are reclassified as likely benign or benign as additional information about the variant becomes available. Additional Information:  A healthy lifestyle will improve overall health and reduce risk for illness. Eating a healthy diet and exercising for 4 hours per week is recommended. Both diet and exercise have been shown to help maintain a healthy weight. Moderate to heavy alcohol use can increase the risk for some cancers. Smoking cigarettes can also increase risk for breast, lung, prostate, pancreatic and other cancers. Managment:  Management guidelines for individuals with pathogenic and likely pathogenic BRCA1 variants have been developed by the [facility]. Please refer to the current NCCN guidelines for the most up to date guidelines. The recommendations listed below are specific to Geo Garcia and are are based on recommendations in the V1.2024 The Genetic/Familial High-Risk Assessment: Breast, Ovarian and Pancreatic Guideline. These recommendations are subject to change over time and the newest guidelines should be referenced for the most up to date recommendations. Plan:  MEN  Breast Cancer Screening  -Breast self-exam training and education starting at age 28 y    -Clinical breast exam, every 12 mo, starting at age 28 y    -Consider annual mammogram screening in men starting at age 48 or 8 years before the earliest known male breast cancer in the family (whichever comes first). We placed a referral to Narendra Junior NP, in the surgical oncology department to discuss breast cancer screening. Prostate Cancer Screening  -Continue to follow up with annual prostate cancer screening (see Guidelines for Prostate Cancer Early Detection). Skin Cancer Screening  -No specific screening guidelines exist for melanoma, but general melanoma risk management is appropriate, such as annual full-body skin examination and minimizing UV exposure.     Pancreatic Cancer Screening:  Current NCCN Guidelines recommend pancreatic cancer screening for individuals with an BRCA1 pathogenic variant and a first- or second-degree relative with exocrine pancreatic cancer from the same side of the family as the identified pathogenic/likely pathogenic germline variant. Pancreatic cancer screening typically begins at age 48 (or 8 years younger than the earliest exocrine pancreatic cancer diagnosis in the family, whichever is earlier) and includes contrast-enhanced MRI/MRCP (magnetic resonance cholangiopancreatography) and/or endoscopic ultrasound (EU). Currently there is no known family history of pancreatic cancer therefore we do not recommend pancreatic screening for Daniel Nicole at this time. We did encourage Daniel Nicole to keep his healthcare providers updated on any changes to his personal or family history as updated information may change this recommendation. Other Screening: There are no additional recommendations based on Lei's result. he should continue cancer screening and medical management as clinically indicated and as determined appropriate by his healthcare providers. Summary:   Positive Result: Daniel Nicole was strongly encouraged to follow up on with our office on an annual basis to review the most up to date guidelines as recommendations are subject to change over time    VUS Result: Daniel Nicole was strongly encouraged to contact us regarding any changes in his personal or family history of cancer as these changes could alter our recommendation regarding genetic testing and/or cancer screening. Daniel Nicole was also encouraged to follow up with us on an annual basis as variant classifications are subject to change.

## 2023-09-25 ENCOUNTER — TELEPHONE (OUTPATIENT)
Dept: HEMATOLOGY ONCOLOGY | Facility: CLINIC | Age: 73
End: 2023-09-25

## 2023-09-25 NOTE — TELEPHONE ENCOUNTER
I called Gayatri Fleming in response to a referral that was received for patient to establish care with Surgical Oncology. Outreach was made to schedule a consultation. I left a voicemail explaining the reason for my call and advised patient to call [facility] at [phone]. Another attempt will be made to contact patient.

## 2023-09-27 ENCOUNTER — TELEPHONE (OUTPATIENT)
Dept: HEMATOLOGY ONCOLOGY | Facility: CLINIC | Age: 73
End: 2023-09-27

## 2023-09-27 NOTE — TELEPHONE ENCOUNTER
I called Claudio Yarbrough in response to a referral that was received for patient to establish care with Surgical Oncology. Outreach was made to schedule a consultation. I left a voicemail explaining the reason for my call and advised patient to call [facility] at [phone]. Another attempt will be made to contact patient.

## 2023-09-28 ENCOUNTER — TELEPHONE (OUTPATIENT)
Dept: HEMATOLOGY ONCOLOGY | Facility: CLINIC | Age: 73
End: 2023-09-28

## 2023-09-28 NOTE — TELEPHONE ENCOUNTER
I called Raghu Lester in response to a referral that was received for patient to establish care with Surgical Oncology. Outreach was made to schedule a consultation. I left a voicemail explaining the reason for my call and advised patient to call [facility] at [phone]. This is the third attempt to schedule patient unsuccessfully. The referral has been closed, a [employer] message has been sent to patient (if applicable) and a letter has been sent to the address on file.

## 2024-01-22 ENCOUNTER — APPOINTMENT (OUTPATIENT)
Dept: LAB | Age: 74
End: 2024-01-22
Payer: MEDICARE

## 2024-01-22 DIAGNOSIS — Z01.818 OTHER SPECIFIED PRE-OPERATIVE EXAMINATION: ICD-10-CM

## 2024-01-22 LAB
ALBUMIN SERPL BCP-MCNC: 4.1 G/DL (ref 3.5–5)
ALP SERPL-CCNC: 72 U/L (ref 34–104)
ALT SERPL W P-5'-P-CCNC: 18 U/L (ref 7–52)
ANION GAP SERPL CALCULATED.3IONS-SCNC: 8 MMOL/L
AST SERPL W P-5'-P-CCNC: 22 U/L (ref 13–39)
BASOPHILS # BLD AUTO: 0.09 THOUSANDS/ÂΜL (ref 0–0.1)
BASOPHILS NFR BLD AUTO: 2 % (ref 0–1)
BILIRUB SERPL-MCNC: 0.48 MG/DL (ref 0.2–1)
BUN SERPL-MCNC: 17 MG/DL (ref 5–25)
CALCIUM SERPL-MCNC: 9.7 MG/DL (ref 8.4–10.2)
CHLORIDE SERPL-SCNC: 106 MMOL/L (ref 96–108)
CO2 SERPL-SCNC: 27 MMOL/L (ref 21–32)
CREAT SERPL-MCNC: 1.18 MG/DL (ref 0.6–1.3)
EOSINOPHIL # BLD AUTO: 0.28 THOUSAND/ÂΜL (ref 0–0.61)
EOSINOPHIL NFR BLD AUTO: 5 % (ref 0–6)
ERYTHROCYTE [DISTWIDTH] IN BLOOD BY AUTOMATED COUNT: 13.2 % (ref 11.6–15.1)
GFR SERPL CREATININE-BSD FRML MDRD: 61 ML/MIN/1.73SQ M
GLUCOSE P FAST SERPL-MCNC: 91 MG/DL (ref 65–99)
HCT VFR BLD AUTO: 43.6 % (ref 36.5–49.3)
HGB BLD-MCNC: 14.2 G/DL (ref 12–17)
IMM GRANULOCYTES # BLD AUTO: 0.03 THOUSAND/UL (ref 0–0.2)
IMM GRANULOCYTES NFR BLD AUTO: 1 % (ref 0–2)
INR PPP: 1.04 (ref 0.84–1.19)
LYMPHOCYTES # BLD AUTO: 1.64 THOUSANDS/ÂΜL (ref 0.6–4.47)
LYMPHOCYTES NFR BLD AUTO: 27 % (ref 14–44)
MCH RBC QN AUTO: 28.3 PG (ref 26.8–34.3)
MCHC RBC AUTO-ENTMCNC: 32.6 G/DL (ref 31.4–37.4)
MCV RBC AUTO: 87 FL (ref 82–98)
MONOCYTES # BLD AUTO: 0.63 THOUSAND/ÂΜL (ref 0.17–1.22)
MONOCYTES NFR BLD AUTO: 10 % (ref 4–12)
NEUTROPHILS # BLD AUTO: 3.42 THOUSANDS/ÂΜL (ref 1.85–7.62)
NEUTS SEG NFR BLD AUTO: 55 % (ref 43–75)
NRBC BLD AUTO-RTO: 0 /100 WBCS
PLATELET # BLD AUTO: 250 THOUSANDS/UL (ref 149–390)
PMV BLD AUTO: 10.6 FL (ref 8.9–12.7)
POTASSIUM SERPL-SCNC: 4.6 MMOL/L (ref 3.5–5.3)
PROT SERPL-MCNC: 7 G/DL (ref 6.4–8.4)
PROTHROMBIN TIME: 13.5 SECONDS (ref 11.6–14.5)
RBC # BLD AUTO: 5.01 MILLION/UL (ref 3.88–5.62)
SODIUM SERPL-SCNC: 141 MMOL/L (ref 135–147)
WBC # BLD AUTO: 6.09 THOUSAND/UL (ref 4.31–10.16)

## 2024-01-22 PROCEDURE — 80053 COMPREHEN METABOLIC PANEL: CPT

## 2024-01-22 PROCEDURE — 36415 COLL VENOUS BLD VENIPUNCTURE: CPT

## 2024-01-22 PROCEDURE — 85025 COMPLETE CBC W/AUTO DIFF WBC: CPT

## 2024-01-22 PROCEDURE — 87086 URINE CULTURE/COLONY COUNT: CPT

## 2024-01-22 PROCEDURE — 85610 PROTHROMBIN TIME: CPT

## 2024-01-23 LAB — BACTERIA UR CULT: NORMAL

## 2024-01-24 NOTE — OP NOTE
**** GI/ENDOSCOPY REPORT ****     PATIENT NAME: Boo FLOWERS - VISIT ID:  Patient ID: [id number]   YOB: 1950     INTRODUCTION: Esophagogastroduodenoscopy - A 74 male patient presents for   an outpatient Esophagogastroduodenoscopy at [address] [place] [address]  INDICATIONS: Nausea  Loss of weight  CONSENT: The benefits, risks, and alternatives to the procedure were   discussed and informed consent was obtained from the patient  PREPARATION:  EKG, pulse, pulse oximetry and blood pressure were monitored   throughout the procedure  ASA Classification: Class 2 - Patient has mild   to moderate systemic disturbance that may or may not be related to the   disorder requiring surgery  MEDICATIONS: Anesthesia-check records     PROCEDURE:  The endoscope was passed without difficulty through the mouth   under direct visualization and advanced to the 2nd portion of the   duodenum  The scope was withdrawn and the mucosa was carefully examined  Retroflexion was performed  FINDINGS:   Esophagus: The esophagus appeared to be normal    GE junction:   The GE junction appeared to be normal   Stomach: Gastritis was found in   the stomach  Multiple biopsies was taken  Duodenum: The duodenum   appeared to be normal   Multiple random biopsies was taken  COMPLICATIONS: There were no complications  IMPRESSIONS: Normal esophagus  Normal GE junction  Gastritis found  Multiple biopsies taken  Normal duodenum  Multiple biopsies taken  RECOMMENDATIONS: Follow-up on the results of the biopsy specimens  Anti-reflux measures: Raise the head of the bed 4 to 6 inches  Avoid   smoking  Avoid excess coffee, tea or other caffeinated beverages  Avoid   garments that fit tightly through the abdomen  Avoid eating before bed  Continue current medications  Colonoscopy to follow  ESTIMATED BLOOD LOSS:     PATHOLOGY SPECIMENS: Multiple biopsies taken   Associated finding: I am care manager through Susan's PCP office and she asked that I reach out about her upcoming appt.  She had labs drawn in Sept but appt was cancelled, now scheduled for 2/9.  If she needs labs redrawn, please place order in [facility] and she will go to  lab to have done.  Also, she still has not received letter for special work accommodations that Christine was to mail in Dec.  If that could be resent, patient would be very grateful. Thank you in advance   Gastritis  Multiple random biopsies taken  PROCEDURE CODES:     ICD-9 Codes: 787 02 Nausea alone 783 21 Loss of weight 535 50 Unspecified   gastritides and gastroduodenitis, without mention of hemorrhage     ICD-10 Codes: R11 0 Nausea R63 4 Abnormal weight loss K29 Gastritis and   duodenitis     PERFORMED BY: ELIZABETH Morales  on 07/25/2018  Version 1, electronically signed by ELIZABETH Olivarez  on 07/25/2018   at 14:17

## 2024-01-29 ENCOUNTER — APPOINTMENT (OUTPATIENT)
Dept: LAB | Age: 74
End: 2024-01-29
Payer: MEDICARE

## 2024-01-29 DIAGNOSIS — Z01.818 OTHER SPECIFIED PRE-OPERATIVE EXAMINATION: ICD-10-CM

## 2024-01-29 LAB
APTT PPP: 28 SECONDS (ref 23–37)
BACTERIA UR QL AUTO: NORMAL /HPF
BILIRUB UR QL STRIP: NEGATIVE
CLARITY UR: CLEAR
COLOR UR: NORMAL
GLUCOSE UR STRIP-MCNC: NEGATIVE MG/DL
HGB UR QL STRIP.AUTO: NEGATIVE
KETONES UR STRIP-MCNC: NEGATIVE MG/DL
LEUKOCYTE ESTERASE UR QL STRIP: NEGATIVE
NITRITE UR QL STRIP: NEGATIVE
NON-SQ EPI CELLS URNS QL MICRO: NORMAL /HPF
PH UR STRIP.AUTO: 6.5 [PH]
PROT UR STRIP-MCNC: NEGATIVE MG/DL
RBC #/AREA URNS AUTO: NORMAL /HPF
SP GR UR STRIP.AUTO: 1.01 (ref 1–1.03)
UROBILINOGEN UR STRIP-ACNC: <2 MG/DL
WBC #/AREA URNS AUTO: NORMAL /HPF

## 2024-01-29 PROCEDURE — 36415 COLL VENOUS BLD VENIPUNCTURE: CPT

## 2024-01-29 PROCEDURE — 85730 THROMBOPLASTIN TIME PARTIAL: CPT

## 2024-01-29 PROCEDURE — 81001 URINALYSIS AUTO W/SCOPE: CPT

## 2024-08-02 ENCOUNTER — TELEPHONE (OUTPATIENT)
Age: 74
End: 2024-08-02

## 2024-08-02 ENCOUNTER — TELEPHONE (OUTPATIENT)
Dept: GENETICS | Facility: CLINIC | Age: 74
End: 2024-08-02

## 2024-08-02 NOTE — TELEPHONE ENCOUNTER
Returned Lei's call regarding his genetic test report.     Lei mentioned that his son in [facility] would like to pursue genetic testing and his providers are requesting a copy of his genetic test report.     I informed Lei that a copy of his genetic test report was sent via M[employer]COM message on 9/21/2023.      I offered to send another [employer] message or send a copy in the mail.  Lei declined and decided to check his [employer] messages for the report.     He will call us back if he has difficult locating the report.

## 2024-08-02 NOTE — TELEPHONE ENCOUNTER
Patient calling to request a copy of genetics test results.  He was seen by Maru Reina 8/31/23.  He can be reached at [phone].

## 2024-10-21 DIAGNOSIS — Z00.6 ENCOUNTER FOR EXAMINATION FOR NORMAL COMPARISON OR CONTROL IN CLINICAL RESEARCH PROGRAM: ICD-10-CM

## 2024-10-25 ENCOUNTER — APPOINTMENT (OUTPATIENT)
Dept: LAB | Age: 74
End: 2024-10-25

## 2024-10-25 DIAGNOSIS — Z00.6 ENCOUNTER FOR EXAMINATION FOR NORMAL COMPARISON OR CONTROL IN CLINICAL RESEARCH PROGRAM: ICD-10-CM

## 2024-10-25 PROCEDURE — 36415 COLL VENOUS BLD VENIPUNCTURE: CPT

## 2024-11-27 ENCOUNTER — APPOINTMENT (OUTPATIENT)
Dept: LAB | Age: 74
End: 2024-11-27
Payer: MEDICARE

## 2024-11-27 DIAGNOSIS — H91.21 SUDDEN RIGHT HEARING LOSS: ICD-10-CM

## 2024-11-27 PROCEDURE — 86617 LYME DISEASE ANTIBODY: CPT

## 2024-11-27 PROCEDURE — 36415 COLL VENOUS BLD VENIPUNCTURE: CPT

## 2024-11-27 PROCEDURE — 86618 LYME DISEASE ANTIBODY: CPT

## 2024-11-28 LAB
B BURGDOR IGG SERPL QL IA: NEGATIVE
B BURGDOR IGG+IGM SER QL IA: POSITIVE
B BURGDOR IGM SERPL QL IA: POSITIVE

## 2024-11-30 DIAGNOSIS — A69.29: ICD-10-CM

## 2024-11-30 DIAGNOSIS — H90.3 ASYMMETRIC SNHL (SENSORINEURAL HEARING LOSS): Primary | ICD-10-CM

## 2024-11-30 DIAGNOSIS — H83.8X9: ICD-10-CM

## 2024-11-30 RX ORDER — DOXYCYCLINE 100 MG/1
100 TABLET ORAL 2 TIMES DAILY
Qty: 42 TABLET | Refills: 0 | Status: SHIPPED | OUTPATIENT
Start: 2024-11-30 | End: 2024-12-21

## 2024-12-12 LAB
APOB+LDLR+PCSK9 GENE MUT ANL BLD/T: NOT DETECTED
BRCA1+BRCA2 DEL+DUP + FULL MUT ANL BLD/T: ABNORMAL
GENE DIS ANL INTERP-IMP: POSITIVE
MLH1+MSH2+MSH6+PMS2 GN DEL+DUP+FUL M: NOT DETECTED

## 2024-12-13 ENCOUNTER — RESULTS FOLLOW-UP (OUTPATIENT)
Dept: LAB | Facility: HOSPITAL | Age: 74
End: 2024-12-13

## 2024-12-13 NOTE — TELEPHONE ENCOUNTER
12/13/2024: Left a voicemail for Geo regarding results from the [employer] research study. First Attempt.

## 2025-01-08 ENCOUNTER — TELEPHONE (OUTPATIENT)
Dept: OTHER | Facility: HOSPITAL | Age: 75
End: 2025-01-08

## 2025-01-08 DIAGNOSIS — R89.8 ABNORMAL GENETIC TEST: Primary | ICD-10-CM

## 2025-01-08 NOTE — TELEPHONE ENCOUNTER
Geo participated in the [employer] study and has an actionable result related to HBOC. He would like a referral for genetic counseling. A referral was placed today.

## 2025-01-09 ENCOUNTER — TELEPHONE (OUTPATIENT)
Dept: GENETICS | Facility: CLINIC | Age: 75
End: 2025-01-09

## 2025-01-23 ENCOUNTER — TELEPHONE (OUTPATIENT)
Dept: GENETICS | Facility: CLINIC | Age: 75
End: 2025-01-23

## 2025-01-23 ENCOUNTER — TELEPHONE (OUTPATIENT)
Dept: HEMATOLOGY ONCOLOGY | Facility: CLINIC | Age: 75
End: 2025-01-23

## 2025-01-23 DIAGNOSIS — Z15.09 BRCA1 GENE MUTATION POSITIVE: Primary | ICD-10-CM

## 2025-01-23 DIAGNOSIS — Z15.01 BRCA1 GENE MUTATION POSITIVE: Primary | ICD-10-CM

## 2025-01-23 NOTE — TELEPHONE ENCOUNTER
Geo called us back to discuss his scheduled genetic counseling appt. We noticed that he had met with one of our genetic counselors in early 2024 and had already had expanded testing completed. I asked Geo if he had any specific questions regarding his BRCA1 mutation and he said that he was only wondering about whether or not he really needs a mammogram. I transferred him to our genetic counselor Malia for a quick discussion about this, but we will be cancelling Geo's GC appointment at this time.

## 2025-01-23 NOTE — TELEPHONE ENCOUNTER
Referral to Surgical Oncology received.  Chart reviewed by  for Surgical oncology at this time.       Diagnosis:     Z15.01, Z15.09 (ICD-10-CM) - BRCA1 gene mutation positive     After review of chart, instructions for scheduling added to referral and sent to be scheduled as advised.

## 2025-01-23 NOTE — TELEPHONE ENCOUNTER
Spoke with Lei regarding his [employer] result which returned positive for his known BRCA1 mutation. He had the following questions:    Geo was interested in whether or not he should pursue a mammogram given his age and mutation. We reviewed up-to-date NCCN guidelines for males with BRCA1 mutations. We reviewed that Lei could meet with a breast specialist in our Surgical Oncology department to receive a clinical exam, education on self-exams, and evaluation for if mammogram is necessary. Lei agreed to a referral to Surgical Oncology.     Geo also asked about pancreatic cancer screening. He in the past has had abdominal MRI/MRCP due to a pancreatic cyst, with his last imaging being in 2022. Although he doesn't have a family history of pancreatic cancer, he asked that I share his result with Dr. Saavedra to see if additional pancreatic cancer screening would be recommended.

## 2025-01-25 DIAGNOSIS — Z15.01 BRCA1 GENE MUTATION POSITIVE: Primary | ICD-10-CM

## 2025-01-25 DIAGNOSIS — Z15.09 BRCA1 GENE MUTATION POSITIVE: Primary | ICD-10-CM

## 2025-01-25 DIAGNOSIS — K86.2 PANCREATIC CYST: ICD-10-CM

## 2025-02-06 ENCOUNTER — OFFICE VISIT (OUTPATIENT)
Dept: SURGICAL ONCOLOGY | Facility: CLINIC | Age: 75
End: 2025-02-06
Payer: MEDICARE

## 2025-02-06 VITALS
TEMPERATURE: 97.2 F | WEIGHT: 201 LBS | HEIGHT: 67 IN | SYSTOLIC BLOOD PRESSURE: 126 MMHG | OXYGEN SATURATION: 98 % | DIASTOLIC BLOOD PRESSURE: 80 MMHG | RESPIRATION RATE: 18 BRPM | HEART RATE: 65 BPM | BODY MASS INDEX: 31.55 KG/M2

## 2025-02-06 DIAGNOSIS — Z91.89 AT HIGH RISK FOR BREAST CANCER: ICD-10-CM

## 2025-02-06 DIAGNOSIS — Z15.09 BRCA1 GENE MUTATION POSITIVE: ICD-10-CM

## 2025-02-06 DIAGNOSIS — N63.0 MASS OF BREAST, UNSPECIFIED LATERALITY: Primary | ICD-10-CM

## 2025-02-06 DIAGNOSIS — Z12.31 SCREENING MAMMOGRAM FOR BREAST CANCER: ICD-10-CM

## 2025-02-06 DIAGNOSIS — Z15.01 BRCA1 GENE MUTATION POSITIVE: ICD-10-CM

## 2025-02-06 PROCEDURE — 99204 OFFICE O/P NEW MOD 45 MIN: CPT

## 2025-02-06 PROCEDURE — G2211 COMPLEX E/M VISIT ADD ON: HCPCS

## 2025-02-06 NOTE — PROGRESS NOTES
Name: Lei Lyle      : 1950      MRN: [id number]  Encounter Provider: NGHIA Garza  Encounter Date: 2025   Encounter department: [facility]  :  Assessment & Plan  BRCA1 gene mutation positive  Mr. Lei Lyle is a 74 y.o. male presenting today for evaluation after referral from Cancer Genetics due to his positive genetic test result, which identified a pathogenic variant in BRCA1. Pt does not have a personal hx of cancer, however his family hx is notable for breast cancer in his daughter (DCIS dx age 40). The same daughter also has a prior hx of Hodgkin's lymphoma. Otherwise his family hx is only significant for lung cancer in his mother (heavy smoker), and bone cancer in his father (dx age 76).  Pt has not had any prior breast cancer screening / imaging. Today I discussed BRCA implications for breast cancer risk at length with Mr. Lyle. Per NCCN guidelines, BRCA1 PV carriers are recommended self-breast exams as well as clinical breast exams (CBE) every 12 months, starting at age 35. Pt may also consider breast cancer screening via annual mammogram, starting at age 50 or 10 years prior to the earliest known age of diagnosis for male breast cancer in the family, whichever comes first. I informed Mr. Lyle that given his age, his risk of developing breast cancer appears to be very low. Per NCCN , studies have shown breast cancer risk in male BRCA carriers to be highest in 30s or 40s. Pt notes bilateral intermittent breast soreness, however no palpable concerns. On CBE today, I did appreciate slightly enlarged breast tissue with two palpable lesions in upper outer quadrants of each breast. I have low suspicion for malignancy as exam was more consistent with lymph nodes and/or fat lobule. In the right breast, I noted the soft, mobile mass at the 10:00 position, about 10CMFN. In the left, another soft mobile mass was noted at the 1-2:00  position, about 6-8CMFN. We reviewed that his Gabapentin medication can cause gynecomastia in males, and may be the reason for his breast discomfort. Script provided for diagnostic bilateral mammogram and diagnostic US if needed for evaluation of bilateral findings. No firm nodularities, skin changes, or nipple discharge identified. S/s of breast cancer were reviewed. We briefly reviewed other risks associated with BRCA1, including increased risk for pancreas cancer, prostate cancer, and melanoma. Pt does does not have a family hx of pancreas cancer, therefore at this time pancreas cancer screening is is not recommended per NCCN guidelines. We did discuss option for AGSE guidelines given his GI recommendation to initiate screening despite lack of family hx. For prostate cancer risk, pt should obtain baseline PSA and SPRING. The option for regular interval prostate cancer screening ALSO exists, starting at age 40. Pt is currently undergoing annual PSA monitoring with PCP, and elects to defer referral to urology if abnormal PSA occurs. For melanoma risk, pt should obtain annual full body skin exam (FBSE). Mr. Lyle is already under care of Derm d/t personal hx of basal cell carcinoma (BCC). I encouraged pt to promptly call if any questions or concerns prior to next scheduled appmt. Assuming breast imaging is benign, I will see the patient back in 1 year or sooner should the need arise. All questions were answered today.  Orders:  •  Ambulatory referral to Surgical Oncology  •  Mammo diagnostic bilateral w 3d and cad; Future  •  US BREAST BILATERAL LIMITED (DIAGNOSTIC); Future    Mass of breast, unspecified laterality  Orders:  •  Mammo diagnostic bilateral w 3d and cad; Future  •  US BREAST BILATERAL LIMITED (DIAGNOSTIC); Future    Screening mammogram for breast cancer  Orders:  •  Mammo diagnostic bilateral w 3d and cad; Future    At high risk for breast cancer  Orders:  •  Mammo diagnostic bilateral w 3d and cad;  "Future  •  US BREAST BILATERAL LIMITED (DIAGNOSTIC); Future      History of Present Illness   Lei Lyle is a 74 y.o. year old male who presents today for evaluation after referral from Cancer Genetics due to his positive genetic test result, which identified a pathogenic variant in BRCA1. He reports his daughter to have a hx of hodgkin's and is s/p chemotherapy, as well as DCIS. Since BRCA1 finding his daughter has elected to undergo bilateral mastectomies in [place]. She is also pursuing oophorectomy per recommendations. His son is in the process of undergoing testing as well, and was recently . He was surprised to hear about this in the family given the lack of breast cancer / prostate. He sees dermatology regularly due to his personal hx of BCC. 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 His GI has been in touch to initiate pancreatic cancer screening per AGSE guidelines. He expressed concern for pancreas cancer, despite his prior cyst resolution. He denies any breast changes, palpable concerns, or nipple discharge. He does note soreness to both breasts bilaterally that occurs intermittently. He reports he is on Gabapentin for nerve pain.     Review of Systems   Constitutional:  Negative for activity change, appetite change, fatigue, fever and unexpected weight change.   Skin: Negative.  Negative for color change, pallor, rash and wound.   Psychiatric/Behavioral:  Negative for confusion. The patient is not nervous/anxious.     A complete review of systems is negative other than that noted above in the HPI.       Objective   /80 (Patient Position: Sitting, Cuff Size: Standard)   Pulse 65   Temp (!) 97.2 °F (36.2 °C) (Temporal)   Resp 18   Ht 5' 7\" (1.702 m)   Wt 91.2 kg (201 lb)   SpO2 98%   BMI 31.48 kg/m²       Physical Exam  Vitals reviewed.   Constitutional:       General: He is not in acute distress.     " Appearance: Normal appearance. He is normal weight. He is not ill-appearing, toxic-appearing or diaphoretic.   HENT:      Head: Normocephalic and atraumatic.   Chest:      Chest wall: No mass.   Breasts:     Right: Mass (soft, mobile mass palpable in the upper outer quadrant of right breast, at 10:00 position, at least 10CMFN) and tenderness present. No swelling, bleeding, inverted nipple, nipple discharge or skin change.      Left: Mass (soft, mobile mass palpable in upper outer quadrant of left breast, approx 1-2:00 position, at least 6CMFN) and tenderness present. No swelling, bleeding, inverted nipple, nipple discharge or skin change.       Neurological:      General: No focal deficit present.      Mental Status: He is alert and oriented to person, place, and time. Mental status is at baseline.      Cranial Nerves: No cranial nerve deficit.      Sensory: No sensory deficit.      Motor: No weakness.      Coordination: Coordination normal.      Gait: Gait normal.      Deep Tendon Reflexes: Reflexes normal.   Psychiatric:         Mood and Affect: Mood normal.         Behavior: Behavior normal.         Thought Content: Thought content normal.         Judgment: Judgment normal.        I have spent a total time of 55 minutes in caring for this patient on the day of the visit/encounter including Diagnostic results, Prognosis, Risks and benefits of tx options, Instructions for management, Patient and family education, Counseling / Coordination of care, Documenting in the medical record, Reviewing / ordering tests, medicine, procedures  , and Obtaining or reviewing history  .

## 2025-02-06 NOTE — ASSESSMENT & PLAN NOTE
Mr. Lei Lyle is a 74 y.o. male presenting today for evaluation after referral from Cancer Genetics due to his positive genetic test result, which identified a pathogenic variant in BRCA1. Pt does not have a personal hx of cancer, however his family hx is notable for breast cancer in his daughter (DCIS dx age 40). The same daughter also has a prior hx of Hodgkin's lymphoma. Otherwise his family hx is only significant for lung cancer in his mother (heavy smoker), and bone cancer in his father (dx age 76).  Pt has not had any prior breast cancer screening / imaging. Today I discussed BRCA implications for breast cancer risk at length with Mr. Lyle. Per NCCN guidelines, BRCA1 PV carriers are recommended self-breast exams as well as clinical breast exams (CBE) every 12 months, starting at age 35. Pt may also consider breast cancer screening via annual mammogram, starting at age 50 or 10 years prior to the earliest known age of diagnosis for male breast cancer in the family, whichever comes first. I informed Mr. Lyle that given his age, his risk of developing breast cancer appears to be very low. Per NCCN , studies have shown breast cancer risk in male BRCA carriers to be highest in 30s or 40s. Pt notes bilateral intermittent breast soreness, however no palpable concerns. On CBE today, I did appreciate slightly enlarged breast tissue with two palpable lesions in upper outer quadrants of each breast. I have low suspicion for malignancy as exam was more consistent with lymph nodes and/or fat lobule. In the right breast, I noted the soft, mobile mass at the 10:00 position, about 10CMFN. In the left, another soft mobile mass was noted at the 1-2:00 position, about 6-8CMFN. We reviewed that his Gabapentin medication can cause gynecomastia in males, and may be the reason for his breast discomfort. Script provided for diagnostic bilateral mammogram and diagnostic US if needed for evaluation of bilateral findings.  No firm nodularities, skin changes, or nipple discharge identified. S/s of breast cancer were reviewed. We briefly reviewed other risks associated with BRCA1, including increased risk for pancreas cancer, prostate cancer, and melanoma. Pt does does not have a family hx of pancreas cancer, therefore at this time pancreas cancer screening is is not recommended per NCCN guidelines. We did discuss option for AGSE guidelines given his GI recommendation to initiate screening despite lack of family hx. For prostate cancer risk, pt should obtain baseline PSA and SPRING. The option for regular interval prostate cancer screening ALSO exists, starting at age 40. Pt is currently undergoing annual PSA monitoring with PCP, and elects to defer referral to urology if abnormal PSA occurs. For melanoma risk, pt should obtain annual full body skin exam (FBSE). Mr. Lyle is already under care of Derm d/t personal hx of basal cell carcinoma (BCC). I encouraged pt to promptly call if any questions or concerns prior to next scheduled appmt. Assuming breast imaging is benign, I will see the patient back in 1 year or sooner should the need arise. All questions were answered today.  Orders:  •  Ambulatory referral to Surgical Oncology  •  Mammo diagnostic bilateral w 3d and cad; Future  •  US BREAST BILATERAL LIMITED (DIAGNOSTIC); Future

## 2025-02-23 ENCOUNTER — HOSPITAL ENCOUNTER (OUTPATIENT)
Dept: RADIOLOGY | Facility: HOSPITAL | Age: 75
Discharge: HOME | End: 2025-02-23
Attending: INTERNAL MEDICINE
Payer: MEDICARE

## 2025-02-23 DIAGNOSIS — Z15.09 BRCA1 GENE MUTATION POSITIVE: ICD-10-CM

## 2025-02-23 DIAGNOSIS — Z15.01 BRCA1 GENE MUTATION POSITIVE: ICD-10-CM

## 2025-02-23 DIAGNOSIS — K86.2 PANCREATIC CYST: ICD-10-CM

## 2025-02-23 PROCEDURE — A9585 GADOBUTROL INJECTION: HCPCS | Performed by: INTERNAL MEDICINE

## 2025-02-23 PROCEDURE — 74183 MRI ABD W/O CNTR FLWD CNTR: CPT

## 2025-02-23 RX ORDER — GADOBUTROL 604.72 MG/ML
9 INJECTION INTRAVENOUS
Status: COMPLETED | OUTPATIENT
Start: 2025-02-23 | End: 2025-02-23

## 2025-02-23 RX ADMIN — GADOBUTROL 9 ML: 604.72 INJECTION INTRAVENOUS at 17:50

## 2025-03-01 ENCOUNTER — RESULTS FOLLOW-UP (OUTPATIENT)
Dept: GASTROENTEROLOGY | Facility: CLINIC | Age: 75
End: 2025-03-01

## 2025-03-01 NOTE — RESULT ENCOUNTER NOTE
I called him with his results.  His pancreatic cyst and mild CBD dilation are unchanged. He will follow-up in my office in about 6-9 months and we will plan for EUS screening in 1 year and then repeat MRI/MRCP in 2 years.

## 2025-03-03 ENCOUNTER — TELEPHONE (OUTPATIENT)
Dept: GASTROENTEROLOGY | Facility: CLINIC | Age: 75
End: 2025-03-03

## 2025-03-03 NOTE — TELEPHONE ENCOUNTER
----- Message from Raul Saavedra MD sent at 3/1/2025  2:53 PM EST -----  Regarding: Office appt  Please schedule him for an office appt with me on my next available date in approx 6-9 months. Thanks!

## 2025-04-17 ENCOUNTER — HOSPITAL ENCOUNTER (OUTPATIENT)
Dept: ULTRASOUND IMAGING | Facility: CLINIC | Age: 75
Discharge: HOME | End: 2025-04-17
Payer: MEDICARE

## 2025-04-17 ENCOUNTER — HOSPITAL ENCOUNTER (OUTPATIENT)
Dept: MAMMOGRAPHY | Facility: CLINIC | Age: 75
Discharge: HOME | End: 2025-04-17
Payer: MEDICARE

## 2025-04-17 ENCOUNTER — RESULTS FOLLOW-UP (OUTPATIENT)
Dept: SURGICAL ONCOLOGY | Facility: CLINIC | Age: 75
End: 2025-04-17

## 2025-04-17 VITALS — BODY MASS INDEX: 31.55 KG/M2 | WEIGHT: 201 LBS | HEIGHT: 67 IN

## 2025-04-17 DIAGNOSIS — Z15.09 BRCA1 GENE MUTATION POSITIVE: ICD-10-CM

## 2025-04-17 DIAGNOSIS — N63.0 MASS OF BREAST, UNSPECIFIED LATERALITY: ICD-10-CM

## 2025-04-17 DIAGNOSIS — Z91.89 AT HIGH RISK FOR BREAST CANCER: ICD-10-CM

## 2025-04-17 DIAGNOSIS — Z15.01 BRCA1 GENE MUTATION POSITIVE: ICD-10-CM

## 2025-04-17 DIAGNOSIS — Z12.31 SCREENING MAMMOGRAM FOR BREAST CANCER: ICD-10-CM

## 2025-04-17 PROCEDURE — G0279 TOMOSYNTHESIS, MAMMO: HCPCS

## 2025-04-17 PROCEDURE — 76642 ULTRASOUND BREAST LIMITED: CPT

## 2025-04-17 PROCEDURE — 77066 DX MAMMO INCL CAD BI: CPT

## 2025-06-17 ENCOUNTER — APPOINTMENT (OUTPATIENT)
Dept: RADIOLOGY | Age: 75
End: 2025-06-17
Payer: MEDICARE

## 2025-06-17 ENCOUNTER — APPOINTMENT (OUTPATIENT)
Dept: LAB | Age: 75
End: 2025-06-17
Payer: MEDICARE

## 2025-06-17 DIAGNOSIS — N40.1 ENLARGED PROSTATE WITH URINARY OBSTRUCTION: ICD-10-CM

## 2025-06-17 DIAGNOSIS — N13.8 ENLARGED PROSTATE WITH URINARY OBSTRUCTION: ICD-10-CM

## 2025-06-17 DIAGNOSIS — E78.5 HYPERLIPIDEMIA, UNSPECIFIED HYPERLIPIDEMIA TYPE: ICD-10-CM

## 2025-06-17 DIAGNOSIS — E55.9 AVITAMINOSIS D: ICD-10-CM

## 2025-06-17 DIAGNOSIS — R20.2 PARESTHESIA: ICD-10-CM

## 2025-06-17 DIAGNOSIS — Z79.899 DRUG THERAPY: ICD-10-CM

## 2025-06-17 LAB
25(OH)D3 SERPL-MCNC: 27.9 NG/ML (ref 30–100)
ALBUMIN SERPL BCG-MCNC: 4.1 G/DL (ref 3.5–5)
ALP SERPL-CCNC: 83 U/L (ref 34–104)
ALT SERPL W P-5'-P-CCNC: 24 U/L (ref 7–52)
ANION GAP SERPL CALCULATED.3IONS-SCNC: 9 MMOL/L (ref 4–13)
AST SERPL W P-5'-P-CCNC: 27 U/L (ref 13–39)
BILIRUB SERPL-MCNC: 0.38 MG/DL (ref 0.2–1)
BUN SERPL-MCNC: 20 MG/DL (ref 5–25)
CALCIUM SERPL-MCNC: 9.4 MG/DL (ref 8.4–10.2)
CHLORIDE SERPL-SCNC: 105 MMOL/L (ref 96–108)
CHOLEST SERPL-MCNC: 145 MG/DL (ref ?–200)
CO2 SERPL-SCNC: 27 MMOL/L (ref 21–32)
CREAT SERPL-MCNC: 1.15 MG/DL (ref 0.6–1.3)
GFR SERPL CREATININE-BSD FRML MDRD: 63 ML/MIN/1.73SQ M
GLUCOSE P FAST SERPL-MCNC: 95 MG/DL (ref 65–99)
HDLC SERPL-MCNC: 47 MG/DL
LDLC SERPL CALC-MCNC: 71 MG/DL (ref 0–100)
NONHDLC SERPL-MCNC: 98 MG/DL
POTASSIUM SERPL-SCNC: 4.8 MMOL/L (ref 3.5–5.3)
PROT SERPL-MCNC: 7.1 G/DL (ref 6.4–8.4)
PSA SERPL-MCNC: 0.15 NG/ML (ref 0–4)
SODIUM SERPL-SCNC: 141 MMOL/L (ref 135–147)
TRIGL SERPL-MCNC: 135 MG/DL (ref ?–150)

## 2025-06-17 PROCEDURE — 36415 COLL VENOUS BLD VENIPUNCTURE: CPT

## 2025-06-17 PROCEDURE — 80061 LIPID PANEL: CPT

## 2025-06-17 PROCEDURE — G0103 PSA SCREENING: HCPCS

## 2025-06-17 PROCEDURE — 80053 COMPREHEN METABOLIC PANEL: CPT

## 2025-06-17 PROCEDURE — 82306 VITAMIN D 25 HYDROXY: CPT

## 2025-06-17 PROCEDURE — 72050 X-RAY EXAM NECK SPINE 4/5VWS: CPT

## 2025-07-02 ENCOUNTER — OFFICE VISIT (OUTPATIENT)
Dept: NEUROSURGERY | Facility: CLINIC | Age: 75
End: 2025-07-02
Payer: MEDICARE

## 2025-07-02 VITALS
OXYGEN SATURATION: 97 % | BODY MASS INDEX: 30.61 KG/M2 | WEIGHT: 195 LBS | HEIGHT: 67 IN | SYSTOLIC BLOOD PRESSURE: 124 MMHG | HEART RATE: 61 BPM | TEMPERATURE: 97.9 F | DIASTOLIC BLOOD PRESSURE: 68 MMHG

## 2025-07-02 DIAGNOSIS — R20.0 NUMBNESS OF RIGHT THUMB: ICD-10-CM

## 2025-07-02 DIAGNOSIS — G56.00 CARPAL TUNNEL SYNDROME: Primary | ICD-10-CM

## 2025-07-02 PROCEDURE — 99203 OFFICE O/P NEW LOW 30 MIN: CPT | Performed by: PHYSICIAN ASSISTANT

## 2025-07-02 RX ORDER — AMOXICILLIN 500 MG/1
500 CAPSULE ORAL AS NEEDED
COMMUNITY
Start: 2025-05-08

## 2025-07-02 NOTE — PROGRESS NOTES
Name: Lei Lyle      : 1950      MRN: [id number]  Encounter Provider: Ian Cruz PA-C  Encounter Date: 2025   Encounter department: [facility]  :  Assessment & Plan  Numbness of right thumb  Patient is a years old gentleman with insignificant past medical history self-referred for evaluation of ongoing right thumb, index finger, middle finger and half of ring finger paresthesia and numbness.  He notes his symptoms started about 2 months ago and progressed over time.  He reported numbness and some burning sensation worse at night when he sleeps and he should have to get up and shake his arm or hang down to get relief.  Denies wearing any wrist splint or any medications.    Patient denies any neck pain, or radicular pain down his upper extremities,  weakness, dropping objects, slight fine motor dysfunction because of the numbness on his fingertips and difficulty writing and holding with his fingers.  Denies any similar problem on the left arm.  No gait issues or bowel/bladder dysfunction.  Patient denies history of diabetes mellitus, hypertension, congestive heart failure, stroke, seizures, bleeding disorder or taking anticoagulant medications.  Smoking cigarettes or drinking alcohol.  Exam-alert and oriented x 3.  Moves all extremities.  Strength 5/5 bilaterally.  Sensation to light touch intact throughout except decreased light touch sensation on the right thumb index middle finger and half of ring finger.  Positive for Phalen's test but negative for Tinel's test of the right wrist.     Imagings:    Patient had flexion-extension cervical spine x-rays and image shows multilevel degenerative disease otherwise alignment maintained and no fracture.    No EMG test or MRI of cervical spine    Plan:     I reviewed cervical spine x-rays with the patient, discussed management plan.  I think patient's symptoms seems right carpal tunnel syndrome.    I will recommend  "EMG test of right upper limb.    Advised to wear right wrist splint at night    Follow-up after EMG test results    All questions and concerns were answered to patient satisfaction.  Patient verbalized understanding's and agreed with the plan.    Call with question or concern        History of Present Illness     Lei Lyle is a 74 y.o. male today self-referred for evaluation of right thumb, index, middle and half of ring finger numbness and paresthesia of 2 months    HPI   See discussion above  Review of Systems   Neurological:  Positive for numbness.        Cervical       NT R-hand/fingers(1-3), burning sensation, worsens at night or when mobile       No PT/SARITA/PM       Sx @ [place] >30yr       XR Cspine 6/17/25       Former tobacco - No AC   All other systems reviewed and are negative.    I have personally reviewed the MA's review of systems and made changes as necessary.    Past Medical History   Past Medical History[1]  Past Surgical History[2]  Family History[3]  he reports that he has quit smoking. His smoking use included cigarettes. He has a 7.5 pack-year smoking history. He has never used smokeless tobacco. He reports that he does not drink alcohol and does not use drugs.  Current Outpatient Medications   Medication Instructions    amoxicillin (AMOXIL) 500 mg, As needed    Cholecalciferol 50 MCG (2000 UT) TABS 1 QD    FLUoxetine (PROzac) 20 mg capsule     gabapentin (NEURONTIN) 300 mg, 3 times daily    lansoprazole (PREVACID) 30 mg capsule PRN    multivitamin (THERAGRAN) TABS     rosuvastatin (CRESTOR) 5 mg tablet 1 tablet, Daily   Allergies[4]   Objective   /68 (BP Location: Left arm, Patient Position: Sitting, Cuff Size: Adult)   Pulse 61   Temp 97.9 °F (36.6 °C) (Temporal)   Ht 5' 7\" (1.702 m)   Wt 88.5 kg (195 lb)   SpO2 97%   BMI 30.54 kg/m²     Physical Exam  Constitutional:       Appearance: Normal appearance.   HENT:      Head: Normocephalic and atraumatic.     Eyes:      " Extraocular Movements: Extraocular movements intact.      Pupils: Pupils are equal, round, and reactive to light.     Pulmonary:      Effort: Pulmonary effort is normal.     Musculoskeletal:         General: Normal range of motion.      Cervical back: Normal range of motion.     Neurological:      General: No focal deficit present.      Motor: Motor strength is normal.     Deep Tendon Reflexes:      Reflex Scores:       Tricep reflexes are 2+ on the right side and 2+ on the left side.       Bicep reflexes are 2+ on the right side and 2+ on the left side.       Brachioradialis reflexes are 2+ on the right side and 2+ on the left side.       Patellar reflexes are 2+ on the right side and 2+ on the left side.       Achilles reflexes are 2+ on the right side and 2+ on the left side.    Psychiatric:         Speech: Speech normal.       Neurological Exam  Mental Status  Awake, alert and oriented to person, place and time. Oriented to person, place and time. Speech is normal. Language is fluent with no aphasia.    Cranial Nerves  CN III, IV, VI: Extraocular movements intact bilaterally. Pupils equal round and reactive to light bilaterally.    Motor  Normal muscle bulk throughout. No fasciculations present. Normal muscle tone. No abnormal involuntary movements. Strength is 5/5 throughout all four extremities.    Sensory  Light touch abnormality:   Right: There is a sensory level at C6.    Reflexes                                            Right                      Left  Brachioradialis                    2+                         2+  Biceps                                 2+                         2+  Triceps                                2+                         2+  Patellar                                2+                         2+  Achilles                                2+                         2+    Right pathological reflexes: Deejay's absent. Ankle clonus absent.  Left pathological reflexes: Deejay's  absent. Ankle clonus absent.    Coordination  Right: Finger-to-nose normal.Left: Finger-to-nose normal.    Gait  Casual gait is normal including stance, stride, and arm swing.             [1]   Past Medical History:  Diagnosis Date    Anxiety     BRCA1 positive     BRCA2 negative     Colon polyp     GERD (gastroesophageal reflux disease)     HL (hearing loss) Over time    Tinnitus Years ago   [2]   Past Surgical History:  Procedure Laterality Date    BACK SURGERY      COLONOSCOPY      FOOT SURGERY Right     hammer toe and bunions    JOINT REPLACEMENT      DE COLONOSCOPY FLX DX W/COLLJ SPEC WHEN PFRMD N/A 07/25/2018    Procedure: EGD AND COLONOSCOPY;  Surgeon: Raul Saavedra MD;  Location: [facility];  Service: Gastroenterology    DE EDG US EXAM SURGICAL ALTER STOM DUODENUM/JEJUNUM  10/15/2018    Procedure: RADIAL ENDOSCOPIC U/S;  Surgeon: Raul Saavedra MD;  Location:  GI LAB;  Service: Gastroenterology    UPPER GASTROINTESTINAL ENDOSCOPY     [3]   Family History  Problem Relation Name Age of Onset    Lung cancer Mother Kimber Lyle 63        Heavy Smoker    Cancer Mother Kimber Lyle     Hodgkin's lymphoma Daughter Miladys Lyle 35        s/p chemotherapy    BRCA1 Positive Daughter Miladys Lyle         pursuing bilateral mastectomies, possible oophorectomy    Breast cancer Daughter Miladys Lyle 40        DCIS s/p lumpectomy    Parkinsonism Father Darian Lyle     Bone cancer Father Darian Lyle 76    Alcohol abuse Father Darian Lyle     Lung cancer Maternal Aunt  75        Smoker    Cancer Maternal Aunt          Unknown cancer  ?brain  ?esophageal   [4] No Active Allergies